# Patient Record
Sex: FEMALE | Race: WHITE | HISPANIC OR LATINO | Employment: UNEMPLOYED | URBAN - METROPOLITAN AREA
[De-identification: names, ages, dates, MRNs, and addresses within clinical notes are randomized per-mention and may not be internally consistent; named-entity substitution may affect disease eponyms.]

---
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
## 2024-03-20 ENCOUNTER — OFFICE VISIT (OUTPATIENT)
Dept: MEDICAL GROUP | Facility: MEDICAL CENTER | Age: 19
End: 2024-03-20
Attending: FAMILY MEDICINE
Payer: COMMERCIAL

## 2024-03-20 VITALS
TEMPERATURE: 97.5 F | RESPIRATION RATE: 16 BRPM | BODY MASS INDEX: 26.1 KG/M2 | SYSTOLIC BLOOD PRESSURE: 90 MMHG | DIASTOLIC BLOOD PRESSURE: 60 MMHG | HEART RATE: 108 BPM | HEIGHT: 66 IN | WEIGHT: 162.4 LBS | OXYGEN SATURATION: 98 %

## 2024-03-20 DIAGNOSIS — R03.1 INCIDENTAL FINDING OF LOW BLOOD PRESSURE: ICD-10-CM

## 2024-03-20 DIAGNOSIS — Z3A.16 16 WEEKS GESTATION OF PREGNANCY: ICD-10-CM

## 2024-03-20 PROCEDURE — 3074F SYST BP LT 130 MM HG: CPT | Performed by: FAMILY MEDICINE

## 2024-03-20 PROCEDURE — 3078F DIAST BP <80 MM HG: CPT | Performed by: FAMILY MEDICINE

## 2024-03-20 PROCEDURE — 99204 OFFICE O/P NEW MOD 45 MIN: CPT | Performed by: FAMILY MEDICINE

## 2024-03-20 ASSESSMENT — PATIENT HEALTH QUESTIONNAIRE - PHQ9: CLINICAL INTERPRETATION OF PHQ2 SCORE: 0

## 2024-03-20 NOTE — LETTER
[facility]  Elana Luo M.D.  [address]  [place] [address]  Fax: [phone]   Authorization for Release/Disclosure of   Protected Health Information   Name: ROSARIO MERCER : 2005 SSN: [id number]   Address: [address] [place] [id number] Phone:    [phone] (home)    I authorize the entity listed below to release/disclose the PHI below to:   [facility]/Elana Luo M.D. and Ana Cristina Garvin M.D.   Provider or Entity Name:  [facility]  [address] [place] [address]  [phone]  Fax: [phone]   Address   City, State, Zip   Phone:      Fax:     Reason for request: continuity of care   Information to be released:    [  ] LAST COLONOSCOPY,  including any PATH REPORT and follow-up  [  ] LAST FIT/COLOGUARD RESULT [  ] LAST DEXA  [  ] LAST MAMMOGRAM  [  ] LAST PAP  [  ] LAST LABS [  ] RETINA EXAM REPORT  [  ] IMMUNIZATION RECORDS  [  ] Release all info      [  ] Check here and initial the line next to each item to release ALL health information INCLUDING  _____ Care and treatment for drug and / or alcohol abuse  _____ HIV testing, infection status, or AIDS  _____ Genetic Testing    DATES OF SERVICE OR TIME PERIOD TO BE DISCLOSED: _____________  I understand and acknowledge that:  * This Authorization may be revoked at any time by you in writing, except if your health information has already been used or disclosed.  * Your health information that will be used or disclosed as a result of you signing this authorization could be re-disclosed by the recipient. If this occurs, your re-disclosed health information may no longer be protected by State or Federal laws.  * You may refuse to sign this Authorization. Your refusal will not affect your ability to obtain treatment.  * This Authorization becomes effective upon signing and will  on (date) __________.      If no date is  indicated, this Authorization will  one (1) year from the signature date.    Name: Mee Ko  Signature: Date:   3/20/2024     PLEASE FAX REQUESTED RECORDS BACK TO: [phone]

## 2024-03-20 NOTE — PROGRESS NOTES
Verbal consent was acquired by the patient to use ByteLight ambient listening note generation during this visit     Subjective:     CC:    Chief Complaint   Patient presents with    Establish Care       HISTORY OF THE PRESENT ILLNESS: Mee Ko is a 19 y.o. female. This pleasant patient is here today to establish primary care with me and discuss the following issues.   Her prior PCP was through [facility]; last seen ~4-5 years ago    Specialists   Ob/gyn    History of Present Illness  The patient presents for establishment of care.     She has not been to a doctor since she was 15 or 16 years old. She has been seeing a doctor in [place] since . She is currently 16 weeks pregnant. She has an OB/GYN, Dr. George Luo, but she quit her job and lost insurance so has been unable to follow up. She last saw Dr. Luo around 01/15/2025. Her estimated due date is 2024. Her pregnancy is going well so far. This was unplanned, but welcomed pregnancy. She had an  when she was 17 years old. Her current partner is the father of the baby. She also had 1 miscarriage. She had Nexplanon implant when she was 13, 14, and 15 years old, which was removed in 2019. She is in a monogamous relationship. She feels safe in her relationship. She is taking prenatal vitamins. She does not take any other medications on a regular basis. She did not have her prenatal labs through Dr. Luo's office. She was told not to lift more than 30 pounds, but she is inquiring if there is a higher weight limit she can go to.  She has been working to become a  before becoming pregnant so would like to be more actie. She has not detected fetal movement yet. She denies any abnormal vaginal discharge or bleeding.  Mood is fine.     She denies any dizziness, lightheadedness, or shortness of breath. She has been a little dehydrated. She has been drinking Gatorade and trying to  increase her intake, but has noted a decrease in her water intake since becoming pregnant.    Supplemental Information  She has 200 out of 100 vision in her right eye. She has eczema, which flares up occasionally. She wears prescription glasses. She sees an optometrist.   She denies any vaping, electronic devices, traditional smoking, or alcohol use. She smoked marijuana regularly when she was 11 or 12 years old. She last used marijuana in 10/2024. She is currently unemployed. 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 She completed high school in 2020. She is in school for personal training, but she is not certified yet. She has 2 dogs.   She denies any medical problems in her mother. Her father has autoimmune disease, arthritis, and ankylosing spondylitis. Her brother was diagnosed with schizophrenia when he was 16 or 17 years old and passed away in . He committed suicide. Her maternal grandmother is alive, but her father passed away 20 years ago. Her paternal grandfather is alive. She denies any family history of diabetes, heart disease, or cancer.    Allergies: Patient has no known allergies.      [id number] - [place], [place] - [address]  [address] [facility]  [place] [address]  Phone: [phone] Fax: [phone]      Current Outpatient Medications   Medication Sig Dispense Refill    Prenatal Vit-Fe Fumarate-FA (PRENATAL VITAMINS PO) Take  by mouth.       No current facility-administered medications for this visit.       No past medical history on file.    No past surgical history on file.    Family History   Problem Relation Age of Onset    Autoimmune Disease Father         ankylosing spondylitis    Eczema Father     Psychiatric Illness Brother          by suicide; h/o schizophrenia depression    Diabetes Neg Hx     Heart Disease Neg Hx     Cancer Neg Hx        Social History     Tobacco Use    Smoking status: Never    Smokeless tobacco: Never   Vaping Use    Vaping Use: Never used  "  Substance Use Topics    Alcohol use: Never    Drug use: Not Currently     Types: Marijuana     Comment: smoked consistently in [age]; decreased since then. Last use 10/2023         Objective:     BP 90/60   Pulse (!) 108   Temp 36.4 °C (97.5 °F)   Resp 16   Ht 1.676 m (5' 6\")   Wt 73.7 kg (162 lb 6.4 oz)   SpO2 98%   BMI 26.21 kg/m²   Physical Exam  Constitutional: Alert, no distress  Skin: No rashes in visible areas  Eye: Conjunctiva clear, lids normal  Respiratory: Unlabored respiratory effort on room air, no cough, lungs clear to auscultation bilaterally  CV: RRR  MSK: Normal gait, moves all extremities  Psych: Alert and oriented x3, normal affect and mood      Assessment & Plan:   19 y.o. female with the following -    1. 16 weeks gestation of pregnancy  -Patient previously established with Dr. George Luo as new OB visit however has been lost to follow-up due to lapse in insurance coverage.  Patient clinically stable.  She is due for prenatal labs and imaging as ordered.  Will start workup and have patient reestablish with new OB for further evaluation and management.  - Encouraged continue healthy lifestyle habits including regular moderate intensity aerobic activity  - Referral to OB/Gyn  - PRENATAL PANEL 3+HIV+HCV; Future  - US-OB 2ND 3RD TRI COMPLETE    2. Incidental finding of low blood pressure  -Incidental finding on inpatient visit to establish.  Discussed recommendation to come in for nurse visit to recheck BP as I am unable to determine patient's baseline.  Discussed may be on low normal side chronically due to her athletic lifestyle.  However given report of decreased water intake expressed concern that blood pressures may be related to dehydration.  Stressed importance of proper water intake to help promote healthy pregnancy.  She is clinically asymptomatic otherwise and is feeling well.    Strict ER/Call/Return precautions discussed. She verbalized understanding and agreed with " plan.    Other orders  - Prenatal Vit-Fe Fumarate-FA (PRENATAL VITAMINS PO); Take  by mouth.       Return if symptoms worsen or fail to improve.    Please note that this dictation was created using voice recognition software. I have made every reasonable attempt to correct obvious errors, but I expect that there are errors of grammar and possibly content that I did not discover before finalizing the note.

## 2024-03-22 ENCOUNTER — HOSPITAL ENCOUNTER (OUTPATIENT)
Dept: LAB | Facility: MEDICAL CENTER | Age: 19
End: 2024-03-22
Attending: FAMILY MEDICINE
Payer: COMMERCIAL

## 2024-03-22 DIAGNOSIS — Z3A.16 16 WEEKS GESTATION OF PREGNANCY: ICD-10-CM

## 2024-03-22 LAB
ABO GROUP BLD: NORMAL
BASOPHILS # BLD AUTO: 0.6 % (ref 0–1.8)
BASOPHILS # BLD: 0.08 K/UL (ref 0–0.12)
BLD GP AB SCN SERPL QL: NORMAL
EOSINOPHIL # BLD AUTO: 0.33 K/UL (ref 0–0.51)
EOSINOPHIL NFR BLD: 2.4 % (ref 0–6.9)
ERYTHROCYTE [DISTWIDTH] IN BLOOD BY AUTOMATED COUNT: 44.9 FL (ref 35.9–50)
HBV SURFACE AG SER QL: ABNORMAL
HCT VFR BLD AUTO: 39.2 % (ref 37–47)
HCV AB SER QL: ABNORMAL
HGB BLD-MCNC: 13.6 G/DL (ref 12–16)
HIV 1+2 AB+HIV1 P24 AG SERPL QL IA: NORMAL
IMM GRANULOCYTES # BLD AUTO: 0.24 K/UL (ref 0–0.11)
IMM GRANULOCYTES NFR BLD AUTO: 1.7 % (ref 0–0.9)
LYMPHOCYTES # BLD AUTO: 2.15 K/UL (ref 1–4.8)
LYMPHOCYTES NFR BLD: 15.6 % (ref 22–41)
MCH RBC QN AUTO: 30.4 PG (ref 27–33)
MCHC RBC AUTO-ENTMCNC: 34.7 G/DL (ref 32.2–35.5)
MCV RBC AUTO: 87.7 FL (ref 81.4–97.8)
MONOCYTES # BLD AUTO: 0.84 K/UL (ref 0–0.85)
MONOCYTES NFR BLD AUTO: 6.1 % (ref 0–13.4)
NEUTROPHILS # BLD AUTO: 10.13 K/UL (ref 1.82–7.42)
NEUTROPHILS NFR BLD: 73.6 % (ref 44–72)
NRBC # BLD AUTO: 0 K/UL
NRBC BLD-RTO: 0 /100 WBC (ref 0–0.2)
PLATELET # BLD AUTO: 216 K/UL (ref 164–446)
PMV BLD AUTO: 11 FL (ref 9–12.9)
RBC # BLD AUTO: 4.47 M/UL (ref 4.2–5.4)
RH BLD: NORMAL
RUBV AB SER QL: 63.9 IU/ML
T PALLIDUM AB SER QL IA: ABNORMAL
WBC # BLD AUTO: 13.8 K/UL (ref 4.8–10.8)

## 2024-03-22 PROCEDURE — 87389 HIV-1 AG W/HIV-1&-2 AB AG IA: CPT

## 2024-03-22 PROCEDURE — 86592 SYPHILIS TEST NON-TREP QUAL: CPT

## 2024-03-22 PROCEDURE — 85025 COMPLETE CBC W/AUTO DIFF WBC: CPT

## 2024-03-22 PROCEDURE — 36415 COLL VENOUS BLD VENIPUNCTURE: CPT

## 2024-03-22 PROCEDURE — 86780 TREPONEMA PALLIDUM: CPT

## 2024-03-22 PROCEDURE — 86850 RBC ANTIBODY SCREEN: CPT

## 2024-03-22 PROCEDURE — 86901 BLOOD TYPING SEROLOGIC RH(D): CPT

## 2024-03-22 PROCEDURE — 87340 HEPATITIS B SURFACE AG IA: CPT

## 2024-03-22 PROCEDURE — 86803 HEPATITIS C AB TEST: CPT

## 2024-03-22 PROCEDURE — 86900 BLOOD TYPING SEROLOGIC ABO: CPT

## 2024-03-22 PROCEDURE — 86762 RUBELLA ANTIBODY: CPT

## 2024-03-26 ENCOUNTER — INITIAL PRENATAL (OUTPATIENT)
Dept: OBGYN | Facility: CLINIC | Age: 19
End: 2024-03-26
Payer: COMMERCIAL

## 2024-03-26 ENCOUNTER — APPOINTMENT (OUTPATIENT)
Dept: OBGYN | Facility: CLINIC | Age: 19
End: 2024-03-26
Payer: COMMERCIAL

## 2024-03-26 VITALS — DIASTOLIC BLOOD PRESSURE: 54 MMHG | BODY MASS INDEX: 26.76 KG/M2 | WEIGHT: 165.8 LBS | SYSTOLIC BLOOD PRESSURE: 106 MMHG

## 2024-03-26 DIAGNOSIS — Z34.80 SUPERVISION OF OTHER NORMAL PREGNANCY, ANTEPARTUM: ICD-10-CM

## 2024-03-26 PROCEDURE — 3074F SYST BP LT 130 MM HG: CPT | Performed by: NURSE PRACTITIONER

## 2024-03-26 PROCEDURE — 0500F INITIAL PRENATAL CARE VISIT: CPT | Performed by: NURSE PRACTITIONER

## 2024-03-26 PROCEDURE — 3078F DIAST BP <80 MM HG: CPT | Performed by: NURSE PRACTITIONER

## 2024-03-26 ASSESSMENT — EDINBURGH POSTNATAL DEPRESSION SCALE (EPDS)
I HAVE BEEN ABLE TO LAUGH AND SEE THE FUNNY SIDE OF THINGS: AS MUCH AS I ALWAYS COULD
I HAVE BLAMED MYSELF UNNECESSARILY WHEN THINGS WENT WRONG: NO, NEVER
I HAVE BEEN ANXIOUS OR WORRIED FOR NO GOOD REASON: YES, SOMETIMES
I HAVE FELT SAD OR MISERABLE: NOT VERY OFTEN
I HAVE BEEN SO UNHAPPY THAT I HAVE HAD DIFFICULTY SLEEPING: YES, SOMETIMES
I HAVE BEEN SO UNHAPPY THAT I HAVE BEEN CRYING: ONLY OCCASIONALLY
TOTAL SCORE: 10
THE THOUGHT OF HARMING MYSELF HAS OCCURRED TO ME: NEVER
I HAVE LOOKED FORWARD WITH ENJOYMENT TO THINGS: AS MUCH AS I EVER DID
I HAVE FELT SCARED OR PANICKY FOR NO GOOD REASON: YES, SOMETIMES
THINGS HAVE BEEN GETTING ON TOP OF ME: YES, SOMETIMES I HAVEN'T BEEN COPING AS WELL AS USUAL

## 2024-03-26 NOTE — PROGRESS NOTES
Subjective:   Mee Ko is a 19 y.o.  who presents for her new OB exam.  She is 17w3d with an WILMER of Estimated Date of Delivery: 24 by LMP she was tracking. She is feeling well. Denies VB, LOF, contractions or pain. No ER visits or previous care in this pregnancy. Denies dysuria, vaginal DC, fever. Reports fetal movement. Desires AFP.  Declines CF.  Desires NIPT testing.     History reviewed. No pertinent past medical history.    Psych Hx: Patient denies any history of depression, anxiety, PTSD, bipolar or any other psychological issues.     EPDS completed: 10    History reviewed. No pertinent surgical history.     OB History    Para Term  AB Living   3 0     2     SAB IAB Ectopic Molar Multiple Live Births   1 1              # Outcome Date GA Lbr Alverto/2nd Weight Sex Delivery Anes PTL Lv   3 Current            2 SAB 2022 4w0d             Birth Comments: Passed on its own   1 IAB 2022     TAB         Obstetric Comments   Surgical  at age 17 at 8 weeks        Gynecological Hx: Denies any hx of STIs, including HSV. Denies any vulvovaginal disorders and no hx of abnormal cervical cytology. Last pap done with Dr. Luo.    Sexual Hx: One current male partner, who is FOB.    Contraceptive Hx: Has used nexplanon in the past and has since discontinued use.     Family History   Problem Relation Age of Onset    No Known Problems Mother     Autoimmune Disease Father         ankylosing spondylitis    Eczema Father     Psychiatric Illness Brother          by suicide; h/o schizophrenia depression    Diabetes Neg Hx     Heart Disease Neg Hx     Cancer Neg Hx      Denies any genetic disorders in family history.     Social History     Socioeconomic History    Marital status: Single     Spouse name: Not on file    Number of children: Not on file    Years of education: Not on file    Highest education level: High school graduate   Occupational History     Comment: Currently unemployed,  previously in    Tobacco Use    Smoking status: Never    Smokeless tobacco: Never   Vaping Use    Vaping Use: Never used   Substance and Sexual Activity    Alcohol use: Never    Drug use: Not Currently     Types: Marijuana     Comment: smoked consistently in [age]; decreased since then. Last use 10/2023    Sexual activity: Yes     Partners: Male     Comment: Monogamous relationship   Other Topics Concern    Not on file   Social History Narrative    Lives with partner/FOB; 2 dogs dhara     Social Determinants of Health     Financial Resource Strain: Not on file   Food Insecurity: Not on file   Transportation Needs: Not on file   Physical Activity: Not on file   Stress: Not on file   Social Connections: Not on file   Intimate Partner Violence: Not on file   Housing Stability: Not on file       FOB is involved and lives with Mee Ko.  Pregnancy is unplanned but desired.    She is currently not working, denies any heavy lifting or exposure to potential teratogens like environmental or occupational toxins.   Denies alcohol use, drug use, or tobacco use in pregnancy.   Denies any current or hx of sexual, emotional or physical abuse or trauma.     Current Medications: PNV   Allergies: Denies allergies to medications, food, or environmental allergies    Objective:      Vitals:    03/26/24 1258   BP: 106/54   Weight: 165 lb 12.8 oz        See Prenatal Physical and Prenatal Vitals      Assessment:      1.  IUP @ 17w3d per US      2.  S=D      3.  See problem list as follows      Plan:   - GC/CT/vaginal pathogens & pap records requested from [facility]  - Encouraged to get flu vacc in local pharmacy as none in clinic now  - NIPT and AFP will be ordered after US records received from [facility] office  - Prenatal labs WNL  - Discussed PNV, nutrition, adequate water intake, and exercise/weight gain in pregnancy  - NOB informational packet with anticipatory guidance given  - Information on Centering Pregnancy  given, n/a  - S/sx of pregnancy warning signs and PTL precautions given  - Complete OB US scheduled   - Return to [facility] in 4 wks

## 2024-03-26 NOTE — PROGRESS NOTES
NOB visit  LMP: 11/25/2023  Good # [phone]  Pt states no complaints or concerns today  Last pap: 01/2024 Negative per pt. Done at Dr Luo office.     Genetic testing: Pt desires   EPDS Score: 10

## 2024-04-11 ENCOUNTER — HOSPITAL ENCOUNTER (EMERGENCY)
Facility: MEDICAL CENTER | Age: 19
End: 2024-04-12
Attending: STUDENT IN AN ORGANIZED HEALTH CARE EDUCATION/TRAINING PROGRAM | Admitting: STUDENT IN AN ORGANIZED HEALTH CARE EDUCATION/TRAINING PROGRAM
Payer: COMMERCIAL

## 2024-04-11 DIAGNOSIS — Z34.80 SUPERVISION OF OTHER NORMAL PREGNANCY, ANTEPARTUM: ICD-10-CM

## 2024-04-11 LAB
APPEARANCE UR: CLEAR
COLOR UR AUTO: YELLOW
GLUCOSE UR QL STRIP.AUTO: NEGATIVE MG/DL
KETONES UR QL STRIP.AUTO: 80 MG/DL
LEUKOCYTE ESTERASE UR QL STRIP.AUTO: NEGATIVE
NITRITE UR QL STRIP.AUTO: NEGATIVE
PH UR STRIP.AUTO: 6 [PH] (ref 5–8)
PROT UR QL STRIP: 30 MG/DL
RBC UR QL AUTO: NEGATIVE
SP GR UR STRIP.AUTO: >=1.03 (ref 1–1.03)

## 2024-04-11 PROCEDURE — 700111 HCHG RX REV CODE 636 W/ 250 OVERRIDE (IP): Mod: JZ,UD | Performed by: MIDWIFE

## 2024-04-11 PROCEDURE — 99282 EMERGENCY DEPT VISIT SF MDM: CPT | Mod: 25

## 2024-04-11 PROCEDURE — 96374 THER/PROPH/DIAG INJ IV PUSH: CPT

## 2024-04-11 PROCEDURE — 700105 HCHG RX REV CODE 258: Mod: UD | Performed by: MIDWIFE

## 2024-04-11 PROCEDURE — 81002 URINALYSIS NONAUTO W/O SCOPE: CPT

## 2024-04-11 RX ORDER — ONDANSETRON 2 MG/ML
4 INJECTION INTRAMUSCULAR; INTRAVENOUS EVERY 4 HOURS PRN
Status: DISCONTINUED | OUTPATIENT
Start: 2024-04-11 | End: 2024-04-12 | Stop reason: HOSPADM

## 2024-04-11 RX ORDER — SODIUM CHLORIDE, SODIUM LACTATE, POTASSIUM CHLORIDE, AND CALCIUM CHLORIDE .6; .31; .03; .02 G/100ML; G/100ML; G/100ML; G/100ML
1000 INJECTION, SOLUTION INTRAVENOUS ONCE
Status: COMPLETED | OUTPATIENT
Start: 2024-04-11 | End: 2024-04-11

## 2024-04-11 RX ADMIN — ONDANSETRON 4 MG: 2 INJECTION INTRAMUSCULAR; INTRAVENOUS at 23:34

## 2024-04-11 RX ADMIN — SODIUM CHLORIDE, POTASSIUM CHLORIDE, SODIUM LACTATE AND CALCIUM CHLORIDE 1000 ML: 600; 310; 30; 20 INJECTION, SOLUTION INTRAVENOUS at 23:34

## 2024-04-12 VITALS
DIASTOLIC BLOOD PRESSURE: 52 MMHG | TEMPERATURE: 97.8 F | RESPIRATION RATE: 18 BRPM | BODY MASS INDEX: 28.17 KG/M2 | OXYGEN SATURATION: 95 % | HEART RATE: 92 BPM | WEIGHT: 165 LBS | SYSTOLIC BLOOD PRESSURE: 96 MMHG | HEIGHT: 64 IN

## 2024-04-12 PROCEDURE — 36415 COLL VENOUS BLD VENIPUNCTURE: CPT

## 2024-04-12 RX ORDER — ONDANSETRON 4 MG/1
4 TABLET, FILM COATED ORAL EVERY 4 HOURS PRN
Qty: 20 TABLET | Refills: 0 | Status: SHIPPED | OUTPATIENT
Start: 2024-04-12 | End: 2024-05-12

## 2024-04-12 ASSESSMENT — ENCOUNTER SYMPTOMS
CONSTITUTIONAL NEGATIVE: 1
VOMITING: 1
NAUSEA: 1

## 2024-04-12 NOTE — ED PROVIDER NOTES
Emergency Obstetric Consultation     Date of Service  2024    Reason for Consultation  Nausea and vomiting since 24 at 1900.    History of Presenting Illness  19 y.o. female  at 19.6 wks who presented 2024 with nausea and vomiting since eating her dinner. Denies abdominal pain, fever, malaise. No significant PMH. Uncomplicated pregnancy.     Reports feeling better with IVF hydration and Zofran.    Review of Systems  Review of Systems   Constitutional: Negative.    Gastrointestinal:  Positive for nausea and vomiting.       Obstetric History    OB History    Para Term  AB Living   3 0     2     SAB IAB Ectopic Molar Multiple Live Births   1 1              # Outcome Date GA Lbr Alverto/2nd Weight Sex Delivery Anes PTL Lv   3 Current            2 SAB 2022 4w0d             Birth Comments: Passed on its own   1 IAB 2022     TAB         Obstetric Comments   Surgical  at age 17 at 8 weeks       Gynecologic History  Patient's last menstrual period was 2023 (approximate).    Medical History  No past medical history on file.    Surgical History   has no past surgical history on file.    Family History  family history includes Autoimmune Disease in her father; Eczema in her father; No Known Problems in her mother; Psychiatric Illness in her brother.    Social History   reports that she has never smoked. She has never used smokeless tobacco. She reports that she does not currently use drugs after having used the following drugs: Marijuana. She reports that she does not drink alcohol.    Medications  Medications Prior to Admission   Medication Sig Dispense Refill Last Dose    Prenatal Vit-Fe Fumarate-FA (PRENATAL VITAMINS PO) Take  by mouth.          Allergies  No Known Allergies    Physical Exam  Maternal Exam:  Uterine Assessment: none  Abdomen: Patient reports no abdominal tenderness. Cervix: not evaluated.  Mode: hand-held doppler probe.    Baseline rate: 150.          Laboratory               None obtained         Imaging  None    Assessment & Plan  Assessment:  Not in labor.   Membrane status: intact.   Fetal well-being: normal.   Nausea and vomiting, resolved  No abdominal pain  Not in labor    Plan:  1. Pt to be discharged home after proves to tolerate PO intake.  2. Zofran Rx for home.  3. Follow up in office at next regularly scheduled visit or sooner prn.   4. 2nd trimester and GI disease warning signs discussed, when to return discussed.          AIDA BellNOBDULIO.

## 2024-04-22 ENCOUNTER — HOSPITAL ENCOUNTER (OUTPATIENT)
Dept: RADIOLOGY | Facility: MEDICAL CENTER | Age: 19
End: 2024-04-22
Attending: FAMILY MEDICINE
Payer: COMMERCIAL

## 2024-04-22 PROCEDURE — 76805 OB US >/= 14 WKS SNGL FETUS: CPT

## 2024-05-01 ENCOUNTER — HOSPITAL ENCOUNTER (OUTPATIENT)
Dept: LAB | Facility: MEDICAL CENTER | Age: 19
End: 2024-05-01
Attending: ADVANCED PRACTICE MIDWIFE
Payer: COMMERCIAL

## 2024-05-01 ENCOUNTER — APPOINTMENT (OUTPATIENT)
Dept: OBGYN | Facility: CLINIC | Age: 19
End: 2024-05-01
Payer: COMMERCIAL

## 2024-05-01 VITALS — BODY MASS INDEX: 29.87 KG/M2 | DIASTOLIC BLOOD PRESSURE: 50 MMHG | WEIGHT: 174 LBS | SYSTOLIC BLOOD PRESSURE: 102 MMHG

## 2024-05-01 DIAGNOSIS — Z34.82 ENCOUNTER FOR SUPERVISION OF OTHER NORMAL PREGNANCY IN SECOND TRIMESTER: ICD-10-CM

## 2024-05-01 PROCEDURE — 3074F SYST BP LT 130 MM HG: CPT | Performed by: ADVANCED PRACTICE MIDWIFE

## 2024-05-01 PROCEDURE — 3078F DIAST BP <80 MM HG: CPT | Performed by: ADVANCED PRACTICE MIDWIFE

## 2024-05-01 PROCEDURE — 0502F SUBSEQUENT PRENATAL CARE: CPT | Performed by: ADVANCED PRACTICE MIDWIFE

## 2024-05-03 LAB
# FETUSES US: NORMAL
AFP MOM SERPL: 0.9
AFP SERPL-MCNC: 76 NG/ML
AGE - REPORTED: 21.5 YR
CURRENT SMOKER: NO
FAMILY MEMBER DISEASES HX: NO
GA METHOD: NORMAL
GA: NORMAL WK
HCG MOM SERPL: 1.56
HCG SERPL-ACNC: NORMAL IU/L
HX OF HEREDITARY DISORDERS: NO
IDDM PATIENT QL: NO
INHIBIN A MOM SERPL: 0.76
INHIBIN A SERPL-MCNC: 155 PG/ML
INTEGRATED SCN PATIENT-IMP: NORMAL
PATHOLOGY STUDY: NORMAL
SPECIMEN DRAWN SERPL: NORMAL
U ESTRIOL MOM SERPL: 0.7
U ESTRIOL SERPL-MCNC: 3.05 NG/ML

## 2024-06-03 ENCOUNTER — APPOINTMENT (OUTPATIENT)
Dept: OBGYN | Facility: CLINIC | Age: 19
End: 2024-06-03
Payer: COMMERCIAL

## 2024-06-03 VITALS — DIASTOLIC BLOOD PRESSURE: 58 MMHG | SYSTOLIC BLOOD PRESSURE: 100 MMHG | BODY MASS INDEX: 31.76 KG/M2 | WEIGHT: 185 LBS

## 2024-06-03 DIAGNOSIS — Z34.80 SUPERVISION OF OTHER NORMAL PREGNANCY, ANTEPARTUM: Primary | ICD-10-CM

## 2024-06-03 PROCEDURE — 0502F SUBSEQUENT PRENATAL CARE: CPT | Performed by: NURSE PRACTITIONER

## 2024-06-03 PROCEDURE — 90715 TDAP VACCINE 7 YRS/> IM: CPT | Performed by: NURSE PRACTITIONER

## 2024-06-03 PROCEDURE — 90471 IMMUNIZATION ADMIN: CPT | Performed by: NURSE PRACTITIONER

## 2024-06-03 NOTE — PROGRESS NOTES
OB follow up   + fetal movement.  No VB, LOF or UC's.  Phone # [phone]  Preferred pharmacy confirmed.  3rd trimester lab orders pended and instructions given to patient.  Kick count sheet given today.  TDap today

## 2024-06-03 NOTE — LETTER
"Count Your Baby's Movements  Another step to a healthy delivery    Meetonya Ko  [facility]  Dept: [phone]    How Many Weeks Pregnant? 27w2d    Date to Begin Countin24              How to use this chart    One way for your physician to keep track of your baby's health is by knowing how often the baby moves (or \"kicks\") in your womb.  You can help your physician to do this by using this chart every day.    Every day, you should see how many hours it takes for your baby to move 10 times.  Start in the morning, as soon as you get up.    First, write down the time your baby moves until you get to 10.  Check off one box every time your baby moves until you get to 10.  Write down the time you finished counting in the last column.  Total how long it took to count up all 10 movements.  Finally, fill in the box that shows how long this took.  After counting 10 movements, you no longer have to count any more that day.  The next morning, just start counting again as soon as you get up.    What should you call a \"movement\"?  It is hard to say, because it will feel different from one mother to another and from one pregnancy to the next.  The important thing is that you count the movements the same way throughout your pregnancy.  If you have more questions, you should ask your physician.    Count carefully every day!  SAMPLE:  Week 28    How many hours did it take to feel 10 movements?       Start  Time     1     2     3     4     5     6     7     8     9     10   Finish Time   Mon 8:20           11:40                                 Fri               Sat               Sun                 IMPORTANT: You should contact your physician if it takes more than two hours for you to feel 10 movements.  Each morning, write down the time and start to count the movements of your baby.  Keep track by checking off one box every time you feel one movement.  When you " Patient verbalized understanding of discharge instructions, need for followup with PMD and/or specialist without fail.  Patient also provided with signs and symptoms to return to the emergency department immediately.  Patient A+Ox3, resps even and nonlabored, patient in no apparent distress. "have felt 10 \"kicks\", write down the time you finished counting in the last column.  Then fill in the   box (over the check antoine) for the number of hours it took.  Be sure to read the complete instructions on the previous page.            "

## 2024-06-07 ENCOUNTER — TELEPHONE (OUTPATIENT)
Dept: OBGYN | Facility: CLINIC | Age: 19
End: 2024-06-07
Payer: COMMERCIAL

## 2024-06-07 NOTE — TELEPHONE ENCOUNTER
Caller Name: Mee Ko    Call Back Number: [phone]     How would the patient prefer to be contacted with a response: Phone call do NOT leave a detailed message    Pt called asking if Whey Protein Powder is safe during pregnancy, she is currently 27w 6d today.    After consulting with another MA (Doreen) we advised the patient to bring the container in for her upcoming OBFU on 06/20/2024 so her provider can look into it for her.    Pt had no further questions or concerns.

## 2024-06-20 ENCOUNTER — ROUTINE PRENATAL (OUTPATIENT)
Dept: OBGYN | Facility: CLINIC | Age: 19
End: 2024-06-20
Payer: COMMERCIAL

## 2024-06-20 VITALS — WEIGHT: 191 LBS | SYSTOLIC BLOOD PRESSURE: 122 MMHG | BODY MASS INDEX: 32.79 KG/M2 | DIASTOLIC BLOOD PRESSURE: 66 MMHG

## 2024-06-20 DIAGNOSIS — Z34.82 ENCOUNTER FOR SUPERVISION OF OTHER NORMAL PREGNANCY IN SECOND TRIMESTER: ICD-10-CM

## 2024-06-20 PROCEDURE — 0502F SUBSEQUENT PRENATAL CARE: CPT | Performed by: OBSTETRICS & GYNECOLOGY

## 2024-06-20 NOTE — PROGRESS NOTES
Pt here today for OB follow up  Pt states no complaints.  Reports +FM  Good # [phone]  Pharmacy Confirmed.  Chaperone offered and not indicated.   Pt declines BTL  Pt states she will try and complete labs on 6/21/2024.

## 2024-06-21 ENCOUNTER — HOSPITAL ENCOUNTER (OUTPATIENT)
Dept: LAB | Facility: MEDICAL CENTER | Age: 19
End: 2024-06-21
Attending: NURSE PRACTITIONER
Payer: COMMERCIAL

## 2024-06-21 DIAGNOSIS — Z34.80 SUPERVISION OF OTHER NORMAL PREGNANCY, ANTEPARTUM: ICD-10-CM

## 2024-06-21 LAB
ERYTHROCYTE [DISTWIDTH] IN BLOOD BY AUTOMATED COUNT: 42.6 FL (ref 35.9–50)
GLUCOSE 1H P 50 G GLC PO SERPL-MCNC: 124 MG/DL (ref 70–139)
HCT VFR BLD AUTO: 37.5 % (ref 37–47)
HGB BLD-MCNC: 12.4 G/DL (ref 12–16)
MCH RBC QN AUTO: 29.7 PG (ref 27–33)
MCHC RBC AUTO-ENTMCNC: 33.1 G/DL (ref 32.2–35.5)
MCV RBC AUTO: 89.7 FL (ref 81.4–97.8)
PLATELET # BLD AUTO: 224 K/UL (ref 164–446)
PMV BLD AUTO: 10.7 FL (ref 9–12.9)
RBC # BLD AUTO: 4.18 M/UL (ref 4.2–5.4)
T PALLIDUM AB SER QL IA: NORMAL
WBC # BLD AUTO: 13.1 K/UL (ref 4.8–10.8)

## 2024-06-21 PROCEDURE — 85027 COMPLETE CBC AUTOMATED: CPT

## 2024-06-21 PROCEDURE — 86780 TREPONEMA PALLIDUM: CPT

## 2024-06-21 PROCEDURE — 36415 COLL VENOUS BLD VENIPUNCTURE: CPT

## 2024-06-21 PROCEDURE — 82950 GLUCOSE TEST: CPT

## 2024-07-02 ENCOUNTER — ROUTINE PRENATAL (OUTPATIENT)
Dept: OBGYN | Facility: CLINIC | Age: 19
End: 2024-07-02
Payer: COMMERCIAL

## 2024-07-02 VITALS — DIASTOLIC BLOOD PRESSURE: 62 MMHG | SYSTOLIC BLOOD PRESSURE: 96 MMHG | BODY MASS INDEX: 32.27 KG/M2 | WEIGHT: 188 LBS

## 2024-07-02 DIAGNOSIS — Z34.80 SUPERVISION OF OTHER NORMAL PREGNANCY, ANTEPARTUM: ICD-10-CM

## 2024-07-02 PROCEDURE — 0502F SUBSEQUENT PRENATAL CARE: CPT | Performed by: NURSE PRACTITIONER

## 2024-07-02 PROCEDURE — 3078F DIAST BP <80 MM HG: CPT | Performed by: NURSE PRACTITIONER

## 2024-07-02 PROCEDURE — 3074F SYST BP LT 130 MM HG: CPT | Performed by: NURSE PRACTITIONER

## 2024-07-24 ENCOUNTER — APPOINTMENT (OUTPATIENT)
Dept: OBGYN | Facility: CLINIC | Age: 19
End: 2024-07-24
Payer: COMMERCIAL

## 2024-07-25 ENCOUNTER — ROUTINE PRENATAL (OUTPATIENT)
Dept: OBGYN | Facility: CLINIC | Age: 19
End: 2024-07-25
Payer: COMMERCIAL

## 2024-07-25 ENCOUNTER — HOSPITAL ENCOUNTER (OUTPATIENT)
Dept: LAB | Facility: MEDICAL CENTER | Age: 19
End: 2024-07-25
Attending: OBSTETRICS & GYNECOLOGY
Payer: COMMERCIAL

## 2024-07-25 VITALS — WEIGHT: 198.4 LBS | SYSTOLIC BLOOD PRESSURE: 120 MMHG | DIASTOLIC BLOOD PRESSURE: 80 MMHG | BODY MASS INDEX: 34.06 KG/M2

## 2024-07-25 DIAGNOSIS — Z34.83 PRENATAL CARE, SUBSEQUENT PREGNANCY IN THIRD TRIMESTER: ICD-10-CM

## 2024-07-25 PROCEDURE — 87491 CHLMYD TRACH DNA AMP PROBE: CPT

## 2024-07-25 PROCEDURE — 3074F SYST BP LT 130 MM HG: CPT | Performed by: OBSTETRICS & GYNECOLOGY

## 2024-07-25 PROCEDURE — 3079F DIAST BP 80-89 MM HG: CPT | Performed by: OBSTETRICS & GYNECOLOGY

## 2024-07-25 PROCEDURE — 0502F SUBSEQUENT PRENATAL CARE: CPT | Performed by: OBSTETRICS & GYNECOLOGY

## 2024-07-25 PROCEDURE — 87591 N.GONORRHOEAE DNA AMP PROB: CPT

## 2024-07-26 LAB
C TRACH DNA SPEC QL NAA+PROBE: NEGATIVE
N GONORRHOEA DNA SPEC QL NAA+PROBE: NEGATIVE
SPECIMEN SOURCE: NORMAL

## 2024-07-27 ENCOUNTER — HOSPITAL ENCOUNTER (OUTPATIENT)
Facility: MEDICAL CENTER | Age: 19
End: 2024-07-27
Attending: OBSTETRICS & GYNECOLOGY | Admitting: OBSTETRICS & GYNECOLOGY
Payer: COMMERCIAL

## 2024-07-27 VITALS
OXYGEN SATURATION: 96 % | HEART RATE: 93 BPM | SYSTOLIC BLOOD PRESSURE: 114 MMHG | RESPIRATION RATE: 16 BRPM | WEIGHT: 200 LBS | HEIGHT: 64 IN | BODY MASS INDEX: 34.15 KG/M2 | DIASTOLIC BLOOD PRESSURE: 66 MMHG

## 2024-07-27 LAB
APPEARANCE UR: ABNORMAL
COLOR UR AUTO: YELLOW
GLUCOSE UR QL STRIP.AUTO: NEGATIVE MG/DL
KETONES UR QL STRIP.AUTO: NEGATIVE MG/DL
LEUKOCYTE ESTERASE UR QL STRIP.AUTO: ABNORMAL
NITRITE UR QL STRIP.AUTO: NEGATIVE
PH UR STRIP.AUTO: 7 [PH] (ref 5–8)
PROT UR QL STRIP: ABNORMAL MG/DL
RBC UR QL AUTO: ABNORMAL
SP GR UR STRIP.AUTO: 1.02 (ref 1–1.03)

## 2024-07-27 PROCEDURE — 99282 EMERGENCY DEPT VISIT SF MDM: CPT | Mod: 25 | Performed by: OBSTETRICS & GYNECOLOGY

## 2024-07-27 PROCEDURE — 59025 FETAL NON-STRESS TEST: CPT | Mod: 26 | Performed by: OBSTETRICS & GYNECOLOGY

## 2024-07-27 PROCEDURE — 81002 URINALYSIS NONAUTO W/O SCOPE: CPT

## 2024-07-27 PROCEDURE — 99282 EMERGENCY DEPT VISIT SF MDM: CPT | Mod: 25

## 2024-07-27 PROCEDURE — 59025 FETAL NON-STRESS TEST: CPT

## 2024-07-27 ASSESSMENT — PAIN SCALES - GENERAL: PAINLEVEL: 4

## 2024-07-28 NOTE — ED PROVIDER NOTES
"OB ED EVALUATION NOTE  LUQ  SUBJECTIVE:  Mee Ko is a 19 y.o.,  at 35w0d who presents for LUQ pain for four days. Pt notes it is worse with bending over or changing position such as laying down or getting up.  Pt denies any trauma t the area.  Her pregnancy has been relatively normal.  She denies vaginal bleeding, leakage of fluid, or contractions. She states the baby is moving.     I personally reviewed the past medical and surgical histories, as well as the problem list.    OBJECTIVE:  Vital Signs: There were no vitals filed for this visit.  GEN: NAD  Abd: soft, NT, gravid, tenderness it specifically elicited with palpation of the lower ribs, especially at the costochondral cartilage juncture.  Ext: no edema    Pelvic:   SSE: deferred    NST read: NST REACTIVE, baseline 140 BPM, moderate variability, accels 2 or more in 20 min  [place]: ni CTX    LABS / IMAGING:  Results for orders placed or performed during the hospital encounter of 24   POCT urinalysis device results   Result Value Ref Range    POC Color Yellow     POC Appearance Cloudy (A)     POC Glucose Negative Negative mg/dL    POC Ketones Negative Negative mg/dL    POC Specific Gravity 1.020 1.005 - 1.030    POC Blood Trace-intact (A) Negative    POC Urine PH 7.0 5.0 - 8.0    POC Protein Trace (A) Negative mg/dL    POC Nitrites Negative Negative    POC Leukocyte Esterase Trace (A) Negative         ASSESSMENT AND PLAN:  19 y.o.    Patient Active Problem List    Diagnosis Date Noted    Supervision of other normal pregnancy, antepartum 2024     Pt is a 18 yo 5\"4\" tall  at 35 weeks EGA with rib pain in the LUQ. We discuss the pathophysiology of this pain in pregnancy. It will likely persist until delivery although it might get better when/if baby drops.  1  The patient was instructed to follow up in the office in 1 weeks. She was counseled to call or return for vaginal bleeding, regular contractions, leakage of fluid or " decreased fetal movement.    Keke Coles M.D.

## 2024-07-28 NOTE — PROGRESS NOTES
: Pt arrives to Pt presents to triage for Left upper quadrant pain consisent for the past 3-4 days.   Pt states no headache, no visual changes today but was at work this week with complaint of some spots in vision, no acute edema, and no RUQ pain.    Pt states she is not feeling any contractions, no leaking of fluid,  no vaginal bleeding, and fetal movement is normal.    :Report to [facility] midwife    : Dr. Coles at bedside   Orders for discharge received     : Pt given AVS. This RN provided  labor precaution and abdominal pain during pregnancy education.   States understanding and has no further questions at this time.   Pt and FOB ambulate off floor independently

## 2024-08-12 ENCOUNTER — TELEPHONE (OUTPATIENT)
Dept: OBGYN | Facility: CLINIC | Age: 19
End: 2024-08-12
Payer: COMMERCIAL

## 2024-08-12 NOTE — TELEPHONE ENCOUNTER
Phone Number Called: [phone]    Call outcome: Spoke to patient regarding message below.    Message: Called to let pt know I completed her disability paperwork and scanned it into her chart. Pt wants to  paperwork in person so she can email them to her employer. Paperwork was not faxed but placed in my folder at the  for pt to . Stated she will come to pick them up today. Call ended.

## 2024-08-15 ENCOUNTER — ROUTINE PRENATAL (OUTPATIENT)
Dept: OBGYN | Facility: CLINIC | Age: 19
End: 2024-08-15
Payer: COMMERCIAL

## 2024-08-15 ENCOUNTER — HOSPITAL ENCOUNTER (OUTPATIENT)
Facility: MEDICAL CENTER | Age: 19
End: 2024-08-15
Attending: NURSE PRACTITIONER
Payer: COMMERCIAL

## 2024-08-15 VITALS — BODY MASS INDEX: 35.87 KG/M2 | DIASTOLIC BLOOD PRESSURE: 68 MMHG | SYSTOLIC BLOOD PRESSURE: 118 MMHG | WEIGHT: 209 LBS

## 2024-08-15 DIAGNOSIS — Z34.80 SUPERVISION OF OTHER NORMAL PREGNANCY, ANTEPARTUM: Primary | ICD-10-CM

## 2024-08-15 DIAGNOSIS — Z34.80 SUPERVISION OF OTHER NORMAL PREGNANCY, ANTEPARTUM: ICD-10-CM

## 2024-08-15 PROCEDURE — 87150 DNA/RNA AMPLIFIED PROBE: CPT

## 2024-08-15 PROCEDURE — 3074F SYST BP LT 130 MM HG: CPT | Performed by: NURSE PRACTITIONER

## 2024-08-15 PROCEDURE — 87081 CULTURE SCREEN ONLY: CPT

## 2024-08-15 PROCEDURE — 3078F DIAST BP <80 MM HG: CPT | Performed by: NURSE PRACTITIONER

## 2024-08-15 PROCEDURE — 0502F SUBSEQUENT PRENATAL CARE: CPT | Performed by: NURSE PRACTITIONER

## 2024-08-15 NOTE — PROGRESS NOTES
OB follow up   + fetal movement.  No VB, LOF or UC's.  Phone # [phone]  Preferred pharmacy confirmed.  GBS today

## 2024-08-16 LAB — GP B STREP DNA SPEC QL NAA+PROBE: POSITIVE

## 2024-08-18 PROBLEM — O99.820 GROUP B STREPTOCOCCUS CARRIER, +RV CULTURE, CURRENTLY PREGNANT: Status: ACTIVE | Noted: 2024-08-18

## 2024-08-22 ENCOUNTER — ROUTINE PRENATAL (OUTPATIENT)
Dept: OBGYN | Facility: CLINIC | Age: 19
End: 2024-08-22
Payer: COMMERCIAL

## 2024-08-22 VITALS — DIASTOLIC BLOOD PRESSURE: 58 MMHG | SYSTOLIC BLOOD PRESSURE: 110 MMHG | BODY MASS INDEX: 35.7 KG/M2 | WEIGHT: 208 LBS

## 2024-08-22 DIAGNOSIS — Z34.80 SUPERVISION OF OTHER NORMAL PREGNANCY, ANTEPARTUM: ICD-10-CM

## 2024-08-22 PROCEDURE — 3074F SYST BP LT 130 MM HG: CPT | Performed by: NURSE PRACTITIONER

## 2024-08-22 PROCEDURE — 0502F SUBSEQUENT PRENATAL CARE: CPT | Performed by: NURSE PRACTITIONER

## 2024-08-22 PROCEDURE — 3078F DIAST BP <80 MM HG: CPT | Performed by: NURSE PRACTITIONER

## 2024-08-22 NOTE — PROGRESS NOTES
SUBJECTIVE:  Pt is a 19 y.o.   at 38w5d  gestation. Presents today for follow-up prenatal care. Reports good  fetal movement. Denies regular cramping/contractions, bleeding or leaking of fluid. Denies dysuria, headaches, N/V. Generally feels well today.     OBJECTIVE:  - See prenatal vitals flow  -   Vitals:    24 1036   BP: 110/58   Weight: 208 lb                 ASSESSMENT:   - IUP at 38w5d    - S=D   -   Patient Active Problem List    Diagnosis Date Noted    Group B Streptococcus carrier, +RV culture, currently pregnant 2024    Supervision of other normal pregnancy, antepartum 2024         PLAN:  - S/sx pregnancy and labor warning signs vs general discomforts discussed  - Fetal movements and/or kick counts reviewed   - Adequate hydration reinforced  - Nutrition/exercise/vitamin education; continue PNV  - Declines cervical exam today as no pain so far, may want IOL at 40 weeks if favorable but will plan for 41 weeks at this point  - Anticipatory guidance given  - RTC in 1 weeks for follow-up prenatal care

## 2024-08-22 NOTE — PROGRESS NOTES
Pt. Here for OB/FU.   Reports Good FM.    Chaperone offered.   Pt states she has no concerns.   Phone/Pharm verified.

## 2024-08-29 ENCOUNTER — ROUTINE PRENATAL (OUTPATIENT)
Dept: OBGYN | Facility: CLINIC | Age: 19
End: 2024-08-29
Payer: COMMERCIAL

## 2024-08-29 VITALS — DIASTOLIC BLOOD PRESSURE: 70 MMHG | SYSTOLIC BLOOD PRESSURE: 116 MMHG | BODY MASS INDEX: 36.49 KG/M2 | WEIGHT: 212.6 LBS

## 2024-08-29 DIAGNOSIS — Z34.80 SUPERVISION OF OTHER NORMAL PREGNANCY, ANTEPARTUM: ICD-10-CM

## 2024-08-29 NOTE — PROGRESS NOTES
Pt has no complaints with cramping, UCs, Vb, LOF though occ tightening at night. +FM. GBS+ - pt aware of results. IOL 9/3 at 9am per pt request - states she really doesn't want to wait for 41wk due to discomfort. Cervix: 1/80/-2, post, soft, vtx. Labor precautions reviewed. Daily FKC recommended. RTC 1 w or sooner prn.

## 2024-08-29 NOTE — PROGRESS NOTES
Pt here for OB follow-up  Reports +FM  No VB, LOF, or UC  Phone number: [phone]  Pharmacy verified with pt.  Pt states some jade betts, lots of pelvic pressure   Cervical check today   GBS (+)

## 2024-09-03 ENCOUNTER — HOSPITAL ENCOUNTER (INPATIENT)
Facility: MEDICAL CENTER | Age: 19
LOS: 2 days | End: 2024-09-05
Attending: STUDENT IN AN ORGANIZED HEALTH CARE EDUCATION/TRAINING PROGRAM | Admitting: STUDENT IN AN ORGANIZED HEALTH CARE EDUCATION/TRAINING PROGRAM
Payer: COMMERCIAL

## 2024-09-03 ENCOUNTER — ANESTHESIA (OUTPATIENT)
Dept: ANESTHESIOLOGY | Facility: MEDICAL CENTER | Age: 19
End: 2024-09-03
Payer: COMMERCIAL

## 2024-09-03 ENCOUNTER — APPOINTMENT (OUTPATIENT)
Dept: OBGYN | Facility: MEDICAL CENTER | Age: 19
End: 2024-09-03
Attending: STUDENT IN AN ORGANIZED HEALTH CARE EDUCATION/TRAINING PROGRAM
Payer: COMMERCIAL

## 2024-09-03 ENCOUNTER — ANESTHESIA EVENT (OUTPATIENT)
Dept: ANESTHESIOLOGY | Facility: MEDICAL CENTER | Age: 19
End: 2024-09-03
Payer: COMMERCIAL

## 2024-09-03 PROBLEM — Z34.90 ENCOUNTER FOR INDUCTION OF LABOR: Status: ACTIVE | Noted: 2024-09-03

## 2024-09-03 LAB
ALBUMIN SERPL BCP-MCNC: 3.2 G/DL (ref 3.2–4.9)
ALBUMIN/GLOB SERPL: 1.1 G/DL
ALP SERPL-CCNC: 167 U/L (ref 30–99)
ALT SERPL-CCNC: 10 U/L (ref 2–50)
ANION GAP SERPL CALC-SCNC: 13 MMOL/L (ref 7–16)
AST SERPL-CCNC: 17 U/L (ref 12–45)
BASOPHILS # BLD AUTO: 0.5 % (ref 0–1.8)
BASOPHILS # BLD: 0.06 K/UL (ref 0–0.12)
BILIRUB SERPL-MCNC: 0.4 MG/DL (ref 0.1–1.5)
BUN SERPL-MCNC: 8 MG/DL (ref 8–22)
CALCIUM ALBUM COR SERPL-MCNC: 9.6 MG/DL (ref 8.5–10.5)
CALCIUM SERPL-MCNC: 9 MG/DL (ref 8.5–10.5)
CHLORIDE SERPL-SCNC: 106 MMOL/L (ref 96–112)
CO2 SERPL-SCNC: 18 MMOL/L (ref 20–33)
CREAT SERPL-MCNC: 0.46 MG/DL (ref 0.5–1.4)
CREAT UR-MCNC: 66.57 MG/DL
EOSINOPHIL # BLD AUTO: 0.1 K/UL (ref 0–0.51)
EOSINOPHIL NFR BLD: 0.8 % (ref 0–6.9)
ERYTHROCYTE [DISTWIDTH] IN BLOOD BY AUTOMATED COUNT: 43.2 FL (ref 35.9–50)
GFR SERPLBLD CREATININE-BSD FMLA CKD-EPI: 139 ML/MIN/1.73 M 2
GLOBULIN SER CALC-MCNC: 3 G/DL (ref 1.9–3.5)
GLUCOSE SERPL-MCNC: 73 MG/DL (ref 65–99)
HCT VFR BLD AUTO: 36.2 % (ref 37–47)
HGB BLD-MCNC: 12.5 G/DL (ref 12–16)
HOLDING TUBE BB 8507: NORMAL
IMM GRANULOCYTES # BLD AUTO: 0.22 K/UL (ref 0–0.11)
IMM GRANULOCYTES NFR BLD AUTO: 1.8 % (ref 0–0.9)
LYMPHOCYTES # BLD AUTO: 1.42 K/UL (ref 1–4.8)
LYMPHOCYTES NFR BLD: 11.5 % (ref 22–41)
MCH RBC QN AUTO: 29.8 PG (ref 27–33)
MCHC RBC AUTO-ENTMCNC: 34.5 G/DL (ref 32.2–35.5)
MCV RBC AUTO: 86.2 FL (ref 81.4–97.8)
MONOCYTES # BLD AUTO: 0.9 K/UL (ref 0–0.85)
MONOCYTES NFR BLD AUTO: 7.3 % (ref 0–13.4)
NEUTROPHILS # BLD AUTO: 9.64 K/UL (ref 1.82–7.42)
NEUTROPHILS NFR BLD: 78.1 % (ref 44–72)
NRBC # BLD AUTO: 0 K/UL
NRBC BLD-RTO: 0 /100 WBC (ref 0–0.2)
PLATELET # BLD AUTO: 198 K/UL (ref 164–446)
PMV BLD AUTO: 11.4 FL (ref 9–12.9)
POTASSIUM SERPL-SCNC: 4 MMOL/L (ref 3.6–5.5)
PROT SERPL-MCNC: 6.2 G/DL (ref 6–8.2)
PROT UR-MCNC: 11 MG/DL (ref 0–15)
PROT/CREAT UR: 165 MG/G (ref 10–107)
RBC # BLD AUTO: 4.2 M/UL (ref 4.2–5.4)
SODIUM SERPL-SCNC: 137 MMOL/L (ref 135–145)
T PALLIDUM AB SER QL IA: NORMAL
WBC # BLD AUTO: 12.3 K/UL (ref 4.8–10.8)

## 2024-09-03 PROCEDURE — 700111 HCHG RX REV CODE 636 W/ 250 OVERRIDE (IP): Performed by: ANESTHESIOLOGY

## 2024-09-03 PROCEDURE — 700111 HCHG RX REV CODE 636 W/ 250 OVERRIDE (IP): Performed by: STUDENT IN AN ORGANIZED HEALTH CARE EDUCATION/TRAINING PROGRAM

## 2024-09-03 PROCEDURE — A9270 NON-COVERED ITEM OR SERVICE: HCPCS | Performed by: STUDENT IN AN ORGANIZED HEALTH CARE EDUCATION/TRAINING PROGRAM

## 2024-09-03 PROCEDURE — 700105 HCHG RX REV CODE 258: Performed by: STUDENT IN AN ORGANIZED HEALTH CARE EDUCATION/TRAINING PROGRAM

## 2024-09-03 PROCEDURE — 700105 HCHG RX REV CODE 258: Performed by: NURSE PRACTITIONER

## 2024-09-03 PROCEDURE — 82570 ASSAY OF URINE CREATININE: CPT

## 2024-09-03 PROCEDURE — 700111 HCHG RX REV CODE 636 W/ 250 OVERRIDE (IP): Performed by: NURSE PRACTITIONER

## 2024-09-03 PROCEDURE — 303615 HCHG EPIDURAL/SPINAL ANESTHESIA FOR LABOR

## 2024-09-03 PROCEDURE — 99999 PR NO CHARGE: CPT | Performed by: STUDENT IN AN ORGANIZED HEALTH CARE EDUCATION/TRAINING PROGRAM

## 2024-09-03 PROCEDURE — 700102 HCHG RX REV CODE 250 W/ 637 OVERRIDE(OP): Performed by: STUDENT IN AN ORGANIZED HEALTH CARE EDUCATION/TRAINING PROGRAM

## 2024-09-03 PROCEDURE — 80053 COMPREHEN METABOLIC PANEL: CPT

## 2024-09-03 PROCEDURE — 84156 ASSAY OF PROTEIN URINE: CPT

## 2024-09-03 PROCEDURE — 770002 HCHG ROOM/CARE - OB PRIVATE (112)

## 2024-09-03 PROCEDURE — 85025 COMPLETE CBC W/AUTO DIFF WBC: CPT

## 2024-09-03 PROCEDURE — 36415 COLL VENOUS BLD VENIPUNCTURE: CPT

## 2024-09-03 PROCEDURE — 700111 HCHG RX REV CODE 636 W/ 250 OVERRIDE (IP)

## 2024-09-03 PROCEDURE — 86780 TREPONEMA PALLIDUM: CPT

## 2024-09-03 RX ORDER — SODIUM CHLORIDE, SODIUM LACTATE, POTASSIUM CHLORIDE, AND CALCIUM CHLORIDE .6; .31; .03; .02 G/100ML; G/100ML; G/100ML; G/100ML
250 INJECTION, SOLUTION INTRAVENOUS PRN
OUTPATIENT
Start: 2024-09-03

## 2024-09-03 RX ORDER — IBUPROFEN 800 MG/1
800 TABLET, FILM COATED ORAL
Status: DISCONTINUED | OUTPATIENT
Start: 2024-09-03 | End: 2024-09-04 | Stop reason: HOSPADM

## 2024-09-03 RX ORDER — ROPIVACAINE HYDROCHLORIDE 2 MG/ML
INJECTION, SOLUTION EPIDURAL; INFILTRATION; PERINEURAL
Status: COMPLETED
Start: 2024-09-03 | End: 2024-09-03

## 2024-09-03 RX ORDER — SODIUM CHLORIDE, SODIUM LACTATE, POTASSIUM CHLORIDE, CALCIUM CHLORIDE 600; 310; 30; 20 MG/100ML; MG/100ML; MG/100ML; MG/100ML
INJECTION, SOLUTION INTRAVENOUS CONTINUOUS
Status: DISCONTINUED | OUTPATIENT
Start: 2024-09-03 | End: 2024-09-05 | Stop reason: HOSPADM

## 2024-09-03 RX ORDER — BUPIVACAINE HYDROCHLORIDE 2.5 MG/ML
INJECTION, SOLUTION EPIDURAL; INFILTRATION; INTRACAUDAL PRN
Status: DISCONTINUED | OUTPATIENT
Start: 2024-09-03 | End: 2024-09-04 | Stop reason: SURG

## 2024-09-03 RX ORDER — BUPIVACAINE HYDROCHLORIDE 2.5 MG/ML
INJECTION, SOLUTION EPIDURAL; INFILTRATION; INTRACAUDAL
Status: COMPLETED
Start: 2024-09-03 | End: 2024-09-03

## 2024-09-03 RX ORDER — LIDOCAINE HYDROCHLORIDE 10 MG/ML
20 INJECTION, SOLUTION INFILTRATION; PERINEURAL
Status: DISCONTINUED | OUTPATIENT
Start: 2024-09-03 | End: 2024-09-04 | Stop reason: HOSPADM

## 2024-09-03 RX ORDER — EPHEDRINE SULFATE 50 MG/ML
5 INJECTION, SOLUTION INTRAVENOUS
OUTPATIENT
Start: 2024-09-03

## 2024-09-03 RX ORDER — TERBUTALINE SULFATE 1 MG/ML
0.25 INJECTION, SOLUTION SUBCUTANEOUS
Status: DISCONTINUED | OUTPATIENT
Start: 2024-09-03 | End: 2024-09-04 | Stop reason: HOSPADM

## 2024-09-03 RX ORDER — OXYTOCIN 10 [USP'U]/ML
10 INJECTION, SOLUTION INTRAMUSCULAR; INTRAVENOUS
Status: DISCONTINUED | OUTPATIENT
Start: 2024-09-03 | End: 2024-09-04 | Stop reason: HOSPADM

## 2024-09-03 RX ORDER — SODIUM CHLORIDE, SODIUM LACTATE, POTASSIUM CHLORIDE, AND CALCIUM CHLORIDE .6; .31; .03; .02 G/100ML; G/100ML; G/100ML; G/100ML
1000 INJECTION, SOLUTION INTRAVENOUS
OUTPATIENT
Start: 2024-09-03

## 2024-09-03 RX ORDER — ACETAMINOPHEN 500 MG
1000 TABLET ORAL
Status: DISCONTINUED | OUTPATIENT
Start: 2024-09-03 | End: 2024-09-04 | Stop reason: HOSPADM

## 2024-09-03 RX ORDER — ROPIVACAINE HYDROCHLORIDE 2 MG/ML
INJECTION, SOLUTION EPIDURAL; INFILTRATION; PERINEURAL CONTINUOUS
OUTPATIENT
Start: 2024-09-04

## 2024-09-03 RX ADMIN — AMPICILLIN SODIUM 1 G: 1 INJECTION, POWDER, FOR SOLUTION INTRAMUSCULAR; INTRAVENOUS at 23:52

## 2024-09-03 RX ADMIN — OXYTOCIN 2 MILLI-UNITS/MIN: 10 INJECTION, SOLUTION INTRAMUSCULAR; INTRAVENOUS at 20:27

## 2024-09-03 RX ADMIN — AMPICILLIN SODIUM 2 G: 2 INJECTION, POWDER, FOR SOLUTION INTRAVENOUS at 19:47

## 2024-09-03 RX ADMIN — MISOPROSTOL 25 MCG: 100 TABLET ORAL at 12:04

## 2024-09-03 RX ADMIN — BUPIVACAINE HYDROCHLORIDE 5 ML: 2.5 INJECTION, SOLUTION EPIDURAL; INFILTRATION; INTRACAUDAL at 23:48

## 2024-09-03 RX ADMIN — FENTANYL CITRATE 100 MCG: 50 INJECTION, SOLUTION INTRAMUSCULAR; INTRAVENOUS at 23:48

## 2024-09-03 RX ADMIN — SODIUM CHLORIDE, POTASSIUM CHLORIDE, SODIUM LACTATE AND CALCIUM CHLORIDE: 600; 310; 30; 20 INJECTION, SOLUTION INTRAVENOUS at 11:45

## 2024-09-03 RX ADMIN — ROPIVACAINE HYDROCHLORIDE 200 MG: 2 INJECTION EPIDURAL; INFILTRATION; PERINEURAL at 23:30

## 2024-09-03 ASSESSMENT — PAIN SCALES - GENERAL: PAINLEVEL: 0 - NO PAIN

## 2024-09-03 ASSESSMENT — PATIENT HEALTH QUESTIONNAIRE - PHQ9
2. FEELING DOWN, DEPRESSED, IRRITABLE, OR HOPELESS: NOT AT ALL
1. LITTLE INTEREST OR PLEASURE IN DOING THINGS: NOT AT ALL
SUM OF ALL RESPONSES TO PHQ9 QUESTIONS 1 AND 2: 0

## 2024-09-03 ASSESSMENT — LIFESTYLE VARIABLES
HAVE PEOPLE ANNOYED YOU BY CRITICIZING YOUR DRINKING: NO
EVER HAD A DRINK FIRST THING IN THE MORNING TO STEADY YOUR NERVES TO GET RID OF A HANGOVER: NO
DOES PATIENT WANT TO STOP DRINKING: NO
EVER FELT BAD OR GUILTY ABOUT YOUR DRINKING: NO
TOTAL SCORE: 0
AVERAGE NUMBER OF DAYS PER WEEK YOU HAVE A DRINK CONTAINING ALCOHOL: 0
ALCOHOL_USE: NO
EVER_SMOKED: NEVER
TOTAL SCORE: 0
HOW MANY TIMES IN THE PAST YEAR HAVE YOU HAD 5 OR MORE DRINKS IN A DAY: 0
CONSUMPTION TOTAL: NEGATIVE
HAVE YOU EVER FELT YOU SHOULD CUT DOWN ON YOUR DRINKING: NO
TOTAL SCORE: 0
ON A TYPICAL DAY WHEN YOU DRINK ALCOHOL HOW MANY DRINKS DO YOU HAVE: 0

## 2024-09-03 NOTE — H&P
History and Physical      Mee Gia Ko is a 19 y.o. year old female  at 40w3d, dated by LMP consistent with 7w US, who presents for scheduled induction of labor. She is feeling overall well. No complaints. No contractions. No vaginal bleeding. No LOF. Feels frequent fetal movement.      ROS: A comprehensive review of systems was negative.    History reviewed. No pertinent past medical history.  History reviewed. No pertinent surgical history.  OB History    Para Term  AB Living   3 0     2     SAB IAB Ectopic Molar Multiple Live Births   1 1              # Outcome Date GA Lbr Alverto/2nd Weight Sex Type Anes PTL Lv   3 Current            2 SAB 2022 4w0d             Birth Comments: Passed on its own   1 IAB 2022     TAB         Obstetric Comments   Surgical  at age 17 at 8 weeks     Social History     Socioeconomic History    Marital status: Single     Spouse name: Not on file    Number of children: Not on file    Years of education: Not on file    Highest education level: High school graduate   Occupational History     Comment: Currently unemployed, previously in    Tobacco Use    Smoking status: Never    Smokeless tobacco: Never   Vaping Use    Vaping status: Never Used   Substance and Sexual Activity    Alcohol use: Never    Drug use: Not Currently     Types: Marijuana     Comment: smoked consistently in 11-17; decreased since then. Last use 10/2023    Sexual activity: Yes     Partners: Male     Comment: Monogamous relationship   Other Topics Concern    Not on file   Social History Narrative    Lives with partner/FOB; 2 nikolay jules     Social Determinants of Health     Financial Resource Strain: Not on file   Food Insecurity: Not on file   Transportation Needs: Not on file   Physical Activity: Not on file   Stress: Not on file   Social Connections: Not on file   Intimate Partner Violence: Not on file   Housing Stability: Not on file     Allergies: Patient has no  "known allergies.    Current Facility-Administered Medications:     LR infusion, , Intravenous, Continuous, Bessie Navarrete M.D., Last Rate: 125 mL/hr at 09/03/24 1145, New Bag at 09/03/24 1145    lidocaine (XYLOCAINE) 1%  injection, 20 mL, Subcutaneous, Once PRN, Bessie Navarrete M.D.    terbutaline (Brethine) injection 0.25 mg, 0.25 mg, Subcutaneous, Once PRN, Bessie Navarrete M.D.    oxytocin (Pitocin) infusion bolus (for post delivery), 20 Units, Intravenous, Once **FOLLOWED BY** oxytocin (Pitocin) infusion (for post delivery), 125 mL/hr, Intravenous, Continuous, Bessie Navarrete M.D.    oxytocin (Pitocin) injection 10 Units, 10 Units, Intramuscular, Once PRN, Bessie Navarrete M.D.    ibuprofen (Motrin) tablet 800 mg, 800 mg, Oral, Once PRN, Bessie Navarrete M.D.    acetaminophen (Tylenol) tablet 1,000 mg, 1,000 mg, Oral, Once PRN, Bessie Navarrete M.D.    miSOPROStol (Cytotec) tablet 25 mcg, 25 mcg, Vaginal, Q4HRS PRN, Bessie Navarrete M.D.    ampicillin (Omnipen) 2 g in  mL IVPB, 2 g, Intravenous, Once, Held at 09/03/24 1145 **FOLLOWED BY** ampicillin (Omnipen) 1 g in  mL IVPB, 1 g, Intravenous, Q4HR, Bessie Navarrete M.D.    Prenatal care with [facility] starting at 17w with following problems:  Patient Active Problem List    Diagnosis Date Noted    Encounter for induction of labor 09/03/2024    Group B Streptococcus carrier, +RV culture, currently pregnant 08/18/2024    Supervision of other normal pregnancy, antepartum 03/26/2024       Objective:      /77   Pulse (!) 101   Temp 36.3 °C (97.3 °F) (Temporal)   Resp 16   Ht 1.626 m (5' 4\")   Wt 97 kg (213 lb 13.5 oz)   SpO2 (!) 87%   GENERAL: Alert, in no apparent distress  PSYCHIATRIC: Appropriate affect, intact insight and judgement.  HEENT: PERRLA, EOMI, no scleral icterus  RESPIRATORY: Normal respiratory effort.  Lungs clear to auscultation.   CARDIOVASCULAR: RRR  ABDOMEN: Soft, gravid, nontender.  EXTREMITIES: No edema, " calves NT  SKIN: No rash    SVE: 2cm/70%/--2    EFM:   Baseline 125 bpm, moderate variability, + accels, no decels   [place]: CTX q 7-10 minutes    Lab:   Blood type: A     Recent Results (from the past 5880 hour(s))   PRENATAL PANEL 3+HIV+HCV    Collection Time: 03/22/24 12:24 PM   Result Value Ref Range    WBC 13.8 (H) 4.8 - 10.8 K/uL    RBC 4.47 4.20 - 5.40 M/uL    Hemoglobin 13.6 12.0 - 16.0 g/dL    Hematocrit 39.2 37.0 - 47.0 %    MCV 87.7 81.4 - 97.8 fL    MCH 30.4 27.0 - 33.0 pg    MCHC 34.7 32.2 - 35.5 g/dL    RDW 44.9 35.9 - 50.0 fL    Platelet Count 216 164 - 446 K/uL    MPV 11.0 9.0 - 12.9 fL    Neutrophils-Polys 73.60 (H) 44.00 - 72.00 %    Lymphocytes 15.60 (L) 22.00 - 41.00 %    Monocytes 6.10 0.00 - 13.40 %    Eosinophils 2.40 0.00 - 6.90 %    Basophils 0.60 0.00 - 1.80 %    Immature Granulocytes 1.70 (H) 0.00 - 0.90 %    Nucleated RBC 0.00 0.00 - 0.20 /100 WBC    Neutrophils (Absolute) 10.13 (H) 1.82 - 7.42 K/uL    Lymphs (Absolute) 2.15 1.00 - 4.80 K/uL    Monos (Absolute) 0.84 0.00 - 0.85 K/uL    Eos (Absolute) 0.33 0.00 - 0.51 K/uL    Baso (Absolute) 0.08 0.00 - 0.12 K/uL    Immature Granulocytes (abs) 0.24 (H) 0.00 - 0.11 K/uL    NRBC (Absolute) 0.00 K/uL    Rubella IgG Antibody 63.90 IU/mL    Hepatitis B Surface Antigen Non-Reactive Non-Reactive    Hepatitis C Antibody Non-Reactive Non-Reactive    Syphilis, Treponemal Qual Non-Reactive Non-Reactive   HIV AG/AB COMBO ASSAY SCREENING    Collection Time: 03/22/24 12:24 PM   Result Value Ref Range    HIV Ag/Ab Combo Assay Non-Reactive Non Reactive   OP Prenatal Panel-Blood Bank    Collection Time: 03/22/24 12:24 PM   Result Value Ref Range    ABO Grouping Only A     Rh Grouping Only POS     Antibody Screen Scrn NEG    POCT urinalysis device results    Collection Time: 04/11/24 10:43 PM   Result Value Ref Range    POC Color Yellow     POC Appearance Clear     POC Glucose Negative Negative mg/dL    POC Ketones 80 (A) Negative mg/dL    POC Specific  Gravity >=1.030 (A) 1.005 - 1.030    POC Blood Negative Negative    POC Urine PH 6.0 5.0 - 8.0    POC Protein 30 (A) Negative mg/dL    POC Nitrites Negative Negative    POC Leukocyte Esterase Negative Negative   AFP TETRA    Collection Time: 05/01/24  1:45 PM   Result Value Ref Range    AFP Value -Eia 76 ng/mL    AFP MOM Value 0.90     Ue3 Value 3.05 ng/mL    Ue3 Mom 0.70     Patient's hCG, 2nd Trimester 78424 IU/L    hCG MoM, 2nd Trimester 1.56     Sabine Value -Eia 155 pg/mL    Sabine Mom Value 0.76     Interpretation Screen Neg     [age] WILMER [age] yr    Maternal Weight 165.0 lbs.     Gest. Age on Collection Date 22 wks, 4 days     Gestational Age Based On Other     Multiple Pregnancy Velasquez     Race Unknown     Insulin Dependent Diabetes No     Smoking No     Family Hx NTD No     Family Hx of Aneuploidy No     Specimen See Note     EER Quad, Maternal Serum See Note    CBC WITHOUT DIFFERENTIAL    Collection Time: 06/21/24  8:35 AM   Result Value Ref Range    WBC 13.1 (H) 4.8 - 10.8 K/uL    RBC 4.18 (L) 4.20 - 5.40 M/uL    Hemoglobin 12.4 12.0 - 16.0 g/dL    Hematocrit 37.5 37.0 - 47.0 %    MCV 89.7 81.4 - 97.8 fL    MCH 29.7 27.0 - 33.0 pg    MCHC 33.1 32.2 - 35.5 g/dL    RDW 42.6 35.9 - 50.0 fL    Platelet Count 224 164 - 446 K/uL    MPV 10.7 9.0 - 12.9 fL   T.PALLIDUM AB KG (SCREENING)    Collection Time: 06/21/24  8:35 AM   Result Value Ref Range    Syphilis, Treponemal Qual Non-Reactive Non-Reactive   GLUCOSE 1HR GESTATIONAL    Collection Time: 06/21/24  8:36 AM   Result Value Ref Range    Glucose, Post Dose 124 70 - 139 mg/dL   Chlamydia/GC, PCR (Urine)    Collection Time: 07/25/24  9:53 AM    Specimen: Urine   Result Value Ref Range    C. trachomatis by PCR Negative Negative    Gc By Dna Probe Negative Negative    Source Urine    POCT urinalysis device results    Collection Time: 07/27/24  7:47 PM   Result Value Ref Range    POC Color Yellow     POC Appearance Cloudy (A)     POC Glucose Negative  Negative mg/dL    POC Ketones Negative Negative mg/dL    POC Specific Gravity 1.020 1.005 - 1.030    POC Blood Trace-intact (A) Negative    POC Urine PH 7.0 5.0 - 8.0    POC Protein Trace (A) Negative mg/dL    POC Nitrites Negative Negative    POC Leukocyte Esterase Trace (A) Negative   GRP B STREP, BY PCR (VELARDE BROTH)    Collection Time: 08/15/24  2:32 PM    Specimen: Genital   Result Value Ref Range    Strep Gp B DNA PCR POSITIVE (A) Negative   CBC with differential    Collection Time: 24 11:20 AM   Result Value Ref Range    WBC 12.3 (H) 4.8 - 10.8 K/uL    RBC 4.20 4.20 - 5.40 M/uL    Hemoglobin 12.5 12.0 - 16.0 g/dL    Hematocrit 36.2 (L) 37.0 - 47.0 %    MCV 86.2 81.4 - 97.8 fL    MCH 29.8 27.0 - 33.0 pg    MCHC 34.5 32.2 - 35.5 g/dL    RDW 43.2 35.9 - 50.0 fL    Platelet Count 198 164 - 446 K/uL    MPV 11.4 9.0 - 12.9 fL    Neutrophils-Polys 78.10 (H) 44.00 - 72.00 %    Lymphocytes 11.50 (L) 22.00 - 41.00 %    Monocytes 7.30 0.00 - 13.40 %    Eosinophils 0.80 0.00 - 6.90 %    Basophils 0.50 0.00 - 1.80 %    Immature Granulocytes 1.80 (H) 0.00 - 0.90 %    Nucleated RBC 0.00 0.00 - 0.20 /100 WBC    Neutrophils (Absolute) 9.64 (H) 1.82 - 7.42 K/uL    Lymphs (Absolute) 1.42 1.00 - 4.80 K/uL    Monos (Absolute) 0.90 (H) 0.00 - 0.85 K/uL    Eos (Absolute) 0.10 0.00 - 0.51 K/uL    Baso (Absolute) 0.06 0.00 - 0.12 K/uL    Immature Granulocytes (abs) 0.22 (H) 0.00 - 0.11 K/uL    NRBC (Absolute) 0.00 K/uL        Assessment:   Mee Gia Salinaswin is a 18yo  at 40w3d, dated by LMP consistent with 7w US, who presented for elective induction of labor.     Plan:       #Labor   - Extensive discussion with Mee about labor induction process. Discussed methods of cervical ripening (mechanical dilation with balloon vs. Used of prostaglandins - misoprostol vs. Dinoprostone). Discussed advantages, r/b/a of each method. With flynn balloon, we often start Pitocin simultaneously and increased per nursing protocol in  "effort to achieve adequate contraction pattern. Misoprostol has the benefit of being less invasive/uncomfortable during placement and can induce uterine contractility on its own; however, there is not an \"off switch,\" and if misoprostol has completely dissolved through the vaginal mucosa, there is not away to reverse its effect in event of tachysystole/fetal distress. Patient would like to proceed with Misoprostol as this time, she is open to repeat examination in ~4h to assess for efficacy of chosen method and consideration of balloon placement.  - Plans for epidural when needed for pain control  - GBS+: Ampicillin with ROM or in active labor    #FWB: Cat 1 tracing    #Contraception: Considering postpartum nexplanon vs. Liletta    #Isolated mild range BP on arrival  - PreE labs drawn with admission blood work, patient has no si/sx severe disease. Continue close monitoring      Bessie Navarrete MD MPH                 "

## 2024-09-03 NOTE — PROGRESS NOTES
Pt arrived to L&D for IOL due to post dates. Placed in room 220    1030 - Pt called out, Vitals assessed. Pt denies LOF, denies VB. Reports normal FM and some mild cramping over the past week.  Pt denies visual changes, headache, RUQ pain, swelling.    1045 - Pt placed on EFM and TOCO to monitor for fetal well being and uterine activity.  Sve per flows    1100 - MD updated, orders placed.     1204 - RN to bedside, cytotec per MAR    1605 - MD to bedside. SVE per flows. POC discussed, all questions answered.    1905- Report to Carmel CHAN. Care relinquished.

## 2024-09-03 NOTE — CARE PLAN
The patient is Stable - Low risk of patient condition declining or worsening    Shift Goals  Clinical Goals:   Patient Goals: healthy mom healthy baby  Family Goals: support    Progress made toward(s) clinical / shift goals:    Problem: Risk for Infection and Impaired Wound Healing  Goal: Patient will remain free from infection  Outcome: Progressing  Patient is afebrile. VS monitored per orders.     Problem: Pain  Goal: Patient's pain will be alleviated or reduced to the patient’s comfort goal  Outcome: Progressing   Pt educated on pain management options. Understands the use of both pharmacological and non-pharmacological pain interventions. Pain assessments continuously implemented.               06-Oct-2021 21:41 coordination of care

## 2024-09-04 LAB
ERYTHROCYTE [DISTWIDTH] IN BLOOD BY AUTOMATED COUNT: 43.4 FL (ref 35.9–50)
HCT VFR BLD AUTO: 34.2 % (ref 37–47)
HGB BLD-MCNC: 11.6 G/DL (ref 12–16)
MCH RBC QN AUTO: 29.1 PG (ref 27–33)
MCHC RBC AUTO-ENTMCNC: 33.9 G/DL (ref 32.2–35.5)
MCV RBC AUTO: 85.9 FL (ref 81.4–97.8)
PLATELET # BLD AUTO: 173 K/UL (ref 164–446)
PMV BLD AUTO: 11 FL (ref 9–12.9)
RBC # BLD AUTO: 3.98 M/UL (ref 4.2–5.4)
WBC # BLD AUTO: 14.8 K/UL (ref 4.8–10.8)

## 2024-09-04 PROCEDURE — 59400 OBSTETRICAL CARE: CPT | Performed by: NURSE PRACTITIONER

## 2024-09-04 PROCEDURE — 700111 HCHG RX REV CODE 636 W/ 250 OVERRIDE (IP): Performed by: STUDENT IN AN ORGANIZED HEALTH CARE EDUCATION/TRAINING PROGRAM

## 2024-09-04 PROCEDURE — 36415 COLL VENOUS BLD VENIPUNCTURE: CPT

## 2024-09-04 PROCEDURE — 770002 HCHG ROOM/CARE - OB PRIVATE (112)

## 2024-09-04 PROCEDURE — 0HQ9XZZ REPAIR PERINEUM SKIN, EXTERNAL APPROACH: ICD-10-PCS | Performed by: STUDENT IN AN ORGANIZED HEALTH CARE EDUCATION/TRAINING PROGRAM

## 2024-09-04 PROCEDURE — 700102 HCHG RX REV CODE 250 W/ 637 OVERRIDE(OP): Performed by: NURSE PRACTITIONER

## 2024-09-04 PROCEDURE — 59409 OBSTETRICAL CARE: CPT

## 2024-09-04 PROCEDURE — A9270 NON-COVERED ITEM OR SERVICE: HCPCS | Performed by: NURSE PRACTITIONER

## 2024-09-04 PROCEDURE — 700105 HCHG RX REV CODE 258: Performed by: NURSE PRACTITIONER

## 2024-09-04 PROCEDURE — 85027 COMPLETE CBC AUTOMATED: CPT

## 2024-09-04 PROCEDURE — 3E033VJ INTRODUCTION OF OTHER HORMONE INTO PERIPHERAL VEIN, PERCUTANEOUS APPROACH: ICD-10-PCS | Performed by: STUDENT IN AN ORGANIZED HEALTH CARE EDUCATION/TRAINING PROGRAM

## 2024-09-04 PROCEDURE — 10907ZC DRAINAGE OF AMNIOTIC FLUID, THERAPEUTIC FROM PRODUCTS OF CONCEPTION, VIA NATURAL OR ARTIFICIAL OPENING: ICD-10-PCS | Performed by: STUDENT IN AN ORGANIZED HEALTH CARE EDUCATION/TRAINING PROGRAM

## 2024-09-04 PROCEDURE — 700105 HCHG RX REV CODE 258: Performed by: STUDENT IN AN ORGANIZED HEALTH CARE EDUCATION/TRAINING PROGRAM

## 2024-09-04 PROCEDURE — 304965 HCHG RECOVERY SERVICES

## 2024-09-04 RX ORDER — BISACODYL 10 MG
10 SUPPOSITORY, RECTAL RECTAL PRN
Status: DISCONTINUED | OUTPATIENT
Start: 2024-09-04 | End: 2024-09-05 | Stop reason: HOSPADM

## 2024-09-04 RX ORDER — DOCUSATE SODIUM 100 MG/1
100 CAPSULE, LIQUID FILLED ORAL 2 TIMES DAILY PRN
Status: DISCONTINUED | OUTPATIENT
Start: 2024-09-04 | End: 2024-09-05 | Stop reason: HOSPADM

## 2024-09-04 RX ORDER — IBUPROFEN 800 MG/1
800 TABLET, FILM COATED ORAL EVERY 8 HOURS PRN
Status: DISCONTINUED | OUTPATIENT
Start: 2024-09-04 | End: 2024-09-05 | Stop reason: HOSPADM

## 2024-09-04 RX ORDER — SODIUM CHLORIDE, SODIUM LACTATE, POTASSIUM CHLORIDE, CALCIUM CHLORIDE 600; 310; 30; 20 MG/100ML; MG/100ML; MG/100ML; MG/100ML
INJECTION, SOLUTION INTRAVENOUS PRN
Status: DISCONTINUED | OUTPATIENT
Start: 2024-09-04 | End: 2024-09-05 | Stop reason: HOSPADM

## 2024-09-04 RX ORDER — SIMETHICONE 125 MG
125 TABLET,CHEWABLE ORAL 4 TIMES DAILY PRN
Status: DISCONTINUED | OUTPATIENT
Start: 2024-09-04 | End: 2024-09-05 | Stop reason: HOSPADM

## 2024-09-04 RX ORDER — DEXTROSE, SODIUM CHLORIDE, SODIUM LACTATE, POTASSIUM CHLORIDE, AND CALCIUM CHLORIDE 5; .6; .31; .03; .02 G/100ML; G/100ML; G/100ML; G/100ML; G/100ML
INJECTION, SOLUTION INTRAVENOUS ONCE
Status: COMPLETED | OUTPATIENT
Start: 2024-09-04 | End: 2024-09-04

## 2024-09-04 RX ORDER — VITAMIN A ACETATE, BETA CAROTENE, ASCORBIC ACID, CHOLECALCIFEROL, .ALPHA.-TOCOPHEROL ACETATE, DL-, THIAMINE MONONITRATE, RIBOFLAVIN, NIACINAMIDE, PYRIDOXINE HYDROCHLORIDE, FOLIC ACID, CYANOCOBALAMIN, CALCIUM CARBONATE, FERROUS FUMARATE, ZINC OXIDE, CUPRIC OXIDE 3080; 12; 120; 400; 1; 1.84; 3; 20; 22; 920; 25; 200; 27; 10; 2 [IU]/1; UG/1; MG/1; [IU]/1; MG/1; MG/1; MG/1; MG/1; MG/1; [IU]/1; MG/1; MG/1; MG/1; MG/1; MG/1
1 TABLET, FILM COATED ORAL
Status: DISCONTINUED | OUTPATIENT
Start: 2024-09-05 | End: 2024-09-05 | Stop reason: HOSPADM

## 2024-09-04 RX ORDER — ACETAMINOPHEN 500 MG
1000 TABLET ORAL EVERY 6 HOURS PRN
Status: DISCONTINUED | OUTPATIENT
Start: 2024-09-04 | End: 2024-09-05 | Stop reason: HOSPADM

## 2024-09-04 RX ORDER — MISOPROSTOL 200 UG/1
600 TABLET ORAL
Status: DISCONTINUED | OUTPATIENT
Start: 2024-09-04 | End: 2024-09-05 | Stop reason: HOSPADM

## 2024-09-04 RX ORDER — DEXTROSE, SODIUM CHLORIDE, SODIUM LACTATE, POTASSIUM CHLORIDE, AND CALCIUM CHLORIDE 5; .6; .31; .03; .02 G/100ML; G/100ML; G/100ML; G/100ML; G/100ML
INJECTION, SOLUTION INTRAVENOUS CONTINUOUS
Status: DISCONTINUED | OUTPATIENT
Start: 2024-09-04 | End: 2024-09-04

## 2024-09-04 RX ORDER — CALCIUM CARBONATE 500 MG/1
1000 TABLET, CHEWABLE ORAL EVERY 6 HOURS PRN
Status: DISCONTINUED | OUTPATIENT
Start: 2024-09-04 | End: 2024-09-05 | Stop reason: HOSPADM

## 2024-09-04 RX ADMIN — ACETAMINOPHEN 1000 MG: 500 TABLET ORAL at 17:09

## 2024-09-04 RX ADMIN — SODIUM CHLORIDE, SODIUM LACTATE, POTASSIUM CHLORIDE, CALCIUM CHLORIDE AND DEXTROSE MONOHYDRATE: 5; 600; 310; 30; 20 INJECTION, SOLUTION INTRAVENOUS at 03:45

## 2024-09-04 RX ADMIN — SODIUM CHLORIDE, POTASSIUM CHLORIDE, SODIUM LACTATE AND CALCIUM CHLORIDE: 600; 310; 30; 20 INJECTION, SOLUTION INTRAVENOUS at 00:00

## 2024-09-04 RX ADMIN — AMPICILLIN SODIUM 1 G: 1 INJECTION, POWDER, FOR SOLUTION INTRAMUSCULAR; INTRAVENOUS at 04:00

## 2024-09-04 RX ADMIN — OXYTOCIN 125 ML/HR: 10 INJECTION, SOLUTION INTRAMUSCULAR; INTRAVENOUS at 09:52

## 2024-09-04 RX ADMIN — OXYTOCIN 20 UNITS: 10 INJECTION, SOLUTION INTRAMUSCULAR; INTRAVENOUS at 08:18

## 2024-09-04 ASSESSMENT — EDINBURGH POSTNATAL DEPRESSION SCALE (EPDS)
I HAVE LOOKED FORWARD WITH ENJOYMENT TO THINGS: AS MUCH AS I EVER DID
I HAVE FELT SAD OR MISERABLE: NO, NOT AT ALL
I HAVE FELT SCARED OR PANICKY FOR NO GOOD REASON: NO, NOT AT ALL
THINGS HAVE BEEN GETTING ON TOP OF ME: NO, I HAVE BEEN COPING AS WELL AS EVER
I HAVE BEEN SO UNHAPPY THAT I HAVE BEEN CRYING: NO, NEVER
THE THOUGHT OF HARMING MYSELF HAS OCCURRED TO ME: NEVER
I HAVE BEEN SO UNHAPPY THAT I HAVE HAD DIFFICULTY SLEEPING: NOT VERY OFTEN
I HAVE BLAMED MYSELF UNNECESSARILY WHEN THINGS WENT WRONG: NO, NEVER
I HAVE BEEN ABLE TO LAUGH AND SEE THE FUNNY SIDE OF THINGS: AS MUCH AS I ALWAYS COULD
I HAVE BEEN ANXIOUS OR WORRIED FOR NO GOOD REASON: NO, NOT AT ALL

## 2024-09-04 ASSESSMENT — PAIN DESCRIPTION - PAIN TYPE
TYPE: ACUTE PAIN

## 2024-09-04 NOTE — PROGRESS NOTES
PATIENT ID:  NAME:  Mee Ko  MRN:               [id number]  YOB: 2005    Subjective:   Pt reports not feeling much pain at this time, is up bouncing on ball.     Objective:    Vitals:    24 1215 24 1450 24 1830 24 1911   BP:  117/70 117/66 118/70   Pulse: 75 65 86 89   Resp:  16 16 16   Temp:  36.1 °C (97 °F) 36.4 °C (97.6 °F) 36.6 °C (97.8 °F)   TempSrc:  Temporal Temporal Temporal   SpO2: 97%   96%   Weight:       Height:           Cervix:  4cm/80%/-1, intact membranes  [place]: Uterine Contractions Q 2-5  minutes.   FHRM: Baseline 125, moderate variability, Accels present, no decels       Assessment:   19 y.o. female  at 40w3d.  GBS positive  Vertex     Plan:   LR for IV hydration  Will start anbx GBS prophylaxis   Pain management options discussed with patient  Will plan for AROM when pt is more uncomfortable with contractions and in better pattern   Continuous fetal heart rate and maternal monitoring  Anticipate

## 2024-09-04 NOTE — LACTATION NOTE
Initial Lactation Consultation:    Met with Mee and baby Alden to provide lactation support. Mee reports breastfeeding to be going well; baby has latched and vigorously nursed at breast twice since delivery.     Alden presents with feeding cues; he is placed skin-to-skin with his mother. Hand expression demonstrated for easily expressible colostrum. Lactation consultant assisted with latch onto left breast, using cradle hold. Latch sustained. Mother of baby denies pain with latch. Infant visualized to have rhythmic, nutritive suck pattern at breast, though he tires quickly, falling asleep at breast after <10 minutes.      feeding and voiding/stooling patterns reviewed. Frequent skin-to-skin and cue-based feeding is encouraged; at least 8 feeds per 24 hours. Reviewed the milk making process, inclusive of supply and demand. Discussed signs of deep, asymmetric latch, and the importance of maintaining good latch to avoid pain/nipple damage and maximize milk transfer. Mee is to offer both breasts at each feeding, and baby should be allowed to self-limit time at breast.     Feeding plan:     Continue with cue-based breastfeeding, at least once every three hours.    Mee is provided with the opportunity to ask questions. These have been answered to her satisfaction. She is encouraged to call RN/lactation for additional breastfeeding assistance, as needed, throughout remainder of hospital stay.       Encouraged follow up with [facility] office for outpatient lactation support.     [facility] Breastfeeding Resource list provided.

## 2024-09-04 NOTE — L&D DELIVERY NOTE
Delivery Note    PATIENT ID:  NAME:  Mee Ko  MRN:               [id number]  YOB: 2005        On 2024  at 08:14, this 19 y.o.,  40w4d now  , GBS negative female delivered via  under epidural anesthesia a viable male infant weight pending with APGAR scores of 8 and 9 at one and five minutes. No nuchal cord present.  Baby to maternal abdomen.  Spontaneous cry.  Mouth and nares bulb suctioned by RN. Delayed cord clamping occurred with cord doubly clamped by myself and cut by FOB.  Spontaneous delivery of placenta grossly intact at 08:19.  Pitocin infusing in IVF.  FF and bleeding light.  Upon vaginal exam, there was left labial abrasion that was stitched to gain hemostasis and a right vaginal abrasion that was also stitched with 3-0 vicryl in the usual sterile fashion. Pt and infant are stable and bonding.  Estimated blood loss: 200.      ARIADNE Mead [address]., Attending Physician

## 2024-09-04 NOTE — PROGRESS NOTES
0700: Bedside report received from ROCIO Burt. POC discussed. VSS. Care assumed at this time. Patient is complete/+1.     0731: This RN began pushing with patient.     0814:  of viable male infant. APGARS 8/9.     0819: Placenta delivery.     1145: Bedside report given to ROCIO Urban. POC discussed. Care relinquished at this time.

## 2024-09-04 NOTE — ANESTHESIA PROCEDURE NOTES
Epidural Block    Date/Time: 9/3/2024 11:38 PM    Performed by: Jevon Zendejas M.D.  Authorized by: Jevon Zendejas M.D.    Patient Location:  OB  Start Time:  9/3/2024 11:38 PM  End Time:  9/3/2024 11:48 PM  Reason for Block: labor analgesia    patient identified, IV checked, site marked, risks and benefits discussed, surgical consent, monitors and equipment checked and pre-op evaluation    Patient Position:  Sitting  Prep: ChloraPrep, patient draped and sterile technique    Monitoring:  Blood pressure, continuous pulse oximetry and heart rate  Approach:  Midline  Location:  L3-L4  Injection Technique:  ASHLIE saline  Skin infiltration:  Lidocaine  Strength:  1%  Dose:  3ml  Needle Type:  Tuohy  Needle Gauge:  17 G  Needle Length:  3.5 in  Loss of resistance::  7  Catheter Size:  19 G  Catheter at Skin Depth:  12  Test Dose Result:  Negative

## 2024-09-04 NOTE — PROGRESS NOTES
1115: This RN to bedside, pt ambulatory up to bathroom with steady gait, able to void, ronda care provided. Linens changed. Pt denies any needs/concerns.

## 2024-09-04 NOTE — CARE PLAN
The patient is Stable - Low risk of patient condition declining or worsening    Shift Goals  Clinical Goals: Delivery of healthy baby  Patient Goals: Healthy mom and baby  Family Goals: Support    Progress made toward(s) clinical / shift goals:    Problem: Knowledge Deficit - L&D  Goal: Patient and family/caregivers will demonstrate understanding of plan of care, disease process/condition, diagnostic tests and medications  Outcome: Progressing     Problem: Pain  Goal: Patient's pain will be alleviated or reduced to the patient’s comfort goal  Outcome: Progressing       Patient is not progressing towards the following goals:

## 2024-09-04 NOTE — CARE PLAN
The patient is Stable - Low risk of patient condition declining or worsening    Shift Goals  Clinical Goals: VSS, fundus firm, lochia light  Patient Goals: bond, breastfeed  Family Goals: emotional support    Progress made toward(s) clinical / shift goals:    Problem: Psychosocial - Postpartum  Goal: Patient will verbalize and demonstrate effective bonding and parenting behavior  Outcome: Progressing  Note: Patient participating in all cares for infant and responding to cues. Engaging in skin to skin. Encouraged to voice all feelings and concerns.      Problem: Altered Physiologic Condition  Goal: Patient physiologically stable as evidenced by normal lochia, palpable uterine involution and vitals within normal limits  Outcome: Progressing  Note: Fundus firm, lochia light. VSS.        Patient is not progressing towards the following goals:

## 2024-09-04 NOTE — PROGRESS NOTES
1900-Report received from Paige CHAN. POC discussed.  1940-Yuval SHAHN at bedside for SVE 4/80-1 and POC discussed. Orders received.  2330- Dr. Zendejas at bedside for Epidural placement  2336-Time out called  2343-Test dose negative  0010-Yuval APRN at bedside for SVE 5/80/-1 and AROM clear  0140- Update Yuval APRN on pt status and Fetal strip. Orders received.  0313- Update Yuval on Fetal strip. Orders received.  0640-Updated Yuval SHAHN on SVE 10/100/+1   0700-Report given to Mikey CHAN. POC discussed.

## 2024-09-04 NOTE — ANESTHESIA PREPROCEDURE EVALUATION
Date: 09/03/24  Procedure: Labor Epidural         Relevant Problems   ANESTHESIA (within normal limits)      PULMONARY (within normal limits)      NEURO (within normal limits)      CARDIAC (within normal limits)      GI (within normal limits)       (within normal limits)      ENDO (within normal limits)      OB (within normal limits)      Other   (positive) Encounter for induction of labor       Physical Exam    Airway   Mallampati: II  TM distance: >3 FB  Neck ROM: full       Cardiovascular - normal exam  Rhythm: regular  Rate: normal  (-) murmur     Dental - normal exam           Pulmonary - normal exam  Breath sounds clear to auscultation     Abdominal    Neurological - normal exam                   Anesthesia Plan    ASA 2       Plan - epidural   Neuraxial block will be labor analgesia                  Pertinent diagnostic labs and testing reviewed    Informed Consent:    Anesthetic plan and risks discussed with patient.

## 2024-09-04 NOTE — PROGRESS NOTES
PATIENT ID:  NAME:  Mee Ko  MRN:               [id number]  YOB: 2005    Subjective:   Pt now resting with her epidural.     Objective:    Vitals:    24 1215 24 1450 24 1830 24 1911   BP:  117/70 117/66 118/70   Pulse: 75 65 86 89   Resp:  16 16 16   Temp:  36.1 °C (97 °F) 36.4 °C (97.6 °F) 36.6 °C (97.8 °F)   TempSrc:  Temporal Temporal Temporal   SpO2: 97%   96%   Weight:       Height:           Cervix:  5cm/80%/-1, AROM'd  clear  [place]: Uterine Contractions Q 2-5  minutes.   FHRM: Baseline 115, moderate variability, Accels present, no decels   Pitocin: 6      Assessment:   19 y.o. female  at 40w4d.       Plan:   LR for IV hydration  AROM'd and continue pitocin IOL  Continuous fetal heart rate and maternal monitoring  Anticipate

## 2024-09-04 NOTE — PROGRESS NOTES
1300 Assumed care from Labor and Delivery ROCIO Urban. Patient transferred to unit in wheelchair with infant in arms and FOB at bedside. Patient transferred from wheelchair to hospital bed via ambulation, tolerated well. Oriented patient to environment, call light/emergency remote, lights, and bed controls. Assessment completed. Fundus firm, lochia light. Plan of care reviewed, verbalized understanding.

## 2024-09-04 NOTE — CARE PLAN
The patient is Watcher - Medium risk of patient condition declining or worsening    Shift Goals  Clinical Goals: safe delivery  Patient Goals: healthy mom and baby  Family Goals: emotional support    Progress made toward(s) clinical / shift goals:    Problem: Knowledge Deficit - L&D  Goal: Patient and family/caregivers will demonstrate understanding of plan of care, disease process/condition, diagnostic tests and medications  Description: Target End Date:  1-3 days or as soon as patient condition allows    1.  Oriented to unit, equipment, visitation policy and means for communicating concern  2.  Complete/review Learning Assessment  3.  Assess patient's preparation, knowledge level and expectations  4.  Provide accurate information in basic terms, clarify misconceptions  5.  Explain disease process/condition, treatment plan, diagnostic tests and medications  6.  Encourage patient and support person to ask questions and verbalize their understanding  7.  Instruct patient/support person in basic interpretation of fetal monitor  8.  Obtain informed consent when appropriate  9.  Demonstrate breathing/relaxation techniques  Outcome: Progressing     Problem: Risk for Infection and Impaired Wound Healing  Goal: Patient will remain free from infection  Description: Target End Date:  1 to 3 days    1.  Utilize Standard Precautions at all times to reduce the risk of transmission of microorganisms from both recognized and unrecognized sources of infection  2.  Infection prevention handouts provided (general/device/diagnosis specific) and documented in Patient Education  3.  Limit vaginal exams as necessary  4.  Use aseptic technique during vaginal exams  5.  Administer antibiotic therapy per provider order  6.  Assess for removal of lines and drains  7.  Line/drain care per policy and/or provider orders  Outcome: Progressing  Note: Patient will remain free from signs and symptoms of infection.     Problem: Pain  Goal:  Patient's pain will be alleviated or reduced to the patient’s comfort goal  Description: Target End Date:  Prior to discharge or change in level of care    1.  Document pain using the appropriate pain scale per order or unit policy  2.  Administer pain medications per provider order and/or assist with epidural/spinal placement as needed  3.  Pain management medications as ordered  4.  Educate and implement non-pharmacologic comfort measures (i.e. relaxation, distraction, massage, cold/heat therapy, etc.)  5.  Assess for nonverbal signs of ineffective coping with pain and offer pain medication and/or epidural anesthesia  Outcome: Progressing  Note: Patient will verbalize understanding of pain management options.

## 2024-09-05 ENCOUNTER — PHARMACY VISIT (OUTPATIENT)
Dept: PHARMACY | Facility: MEDICAL CENTER | Age: 19
End: 2024-09-05
Payer: MEDICARE

## 2024-09-05 VITALS
OXYGEN SATURATION: 96 % | BODY MASS INDEX: 36.51 KG/M2 | TEMPERATURE: 97.9 F | HEART RATE: 83 BPM | SYSTOLIC BLOOD PRESSURE: 117 MMHG | DIASTOLIC BLOOD PRESSURE: 66 MMHG | HEIGHT: 64 IN | WEIGHT: 213.85 LBS | RESPIRATION RATE: 17 BRPM

## 2024-09-05 PROBLEM — Z34.90 ENCOUNTER FOR INDUCTION OF LABOR: Status: RESOLVED | Noted: 2024-09-03 | Resolved: 2024-09-05

## 2024-09-05 PROBLEM — Z34.80 SUPERVISION OF OTHER NORMAL PREGNANCY, ANTEPARTUM: Status: RESOLVED | Noted: 2024-03-26 | Resolved: 2024-09-05

## 2024-09-05 PROCEDURE — 700102 HCHG RX REV CODE 250 W/ 637 OVERRIDE(OP): Performed by: NURSE PRACTITIONER

## 2024-09-05 PROCEDURE — RXMED WILLOW AMBULATORY MEDICATION CHARGE

## 2024-09-05 PROCEDURE — A9270 NON-COVERED ITEM OR SERVICE: HCPCS | Performed by: NURSE PRACTITIONER

## 2024-09-05 RX ORDER — IBUPROFEN 800 MG/1
800 TABLET, FILM COATED ORAL EVERY 8 HOURS PRN
Qty: 60 TABLET | Refills: 0 | Status: SHIPPED | OUTPATIENT
Start: 2024-09-05

## 2024-09-05 RX ORDER — ACETAMINOPHEN 500 MG
1000 TABLET ORAL EVERY 6 HOURS PRN
Qty: 30 TABLET | Refills: 0 | Status: SHIPPED | OUTPATIENT
Start: 2024-09-05

## 2024-09-05 RX ORDER — PSEUDOEPHEDRINE HCL 30 MG
100 TABLET ORAL 2 TIMES DAILY PRN
Qty: 60 CAPSULE | Refills: 0 | Status: SHIPPED | OUTPATIENT
Start: 2024-09-05

## 2024-09-05 RX ADMIN — ACETAMINOPHEN 1000 MG: 500 TABLET ORAL at 07:49

## 2024-09-05 RX ADMIN — PRENATAL WITH FERROUS FUM AND FOLIC ACID 1 TABLET: 3080; 920; 120; 400; 22; 1.84; 3; 20; 10; 1; 12; 200; 27; 25; 2 TABLET ORAL at 07:48

## 2024-09-05 ASSESSMENT — PAIN DESCRIPTION - PAIN TYPE
TYPE: ACUTE PAIN
TYPE: ACUTE PAIN

## 2024-09-05 NOTE — DISCHARGE SUMMARY
Discharge Summary:      Mee Ko      Admit Date:   9/3/2024  Discharge Date:  2024     Admitting diagnosis:  Encounter for induction of labor [Z34.90]  Discharge Diagnosis: Status post vaginal, spontaneous.  Pregnancy Complications: none  Tubal Ligation:  no        History:  History reviewed. No pertinent past medical history.    OB History    Para Term  AB Living   3 1 1   2 1   SAB IAB Ectopic Molar Multiple Live Births   1 1     0 1      # Outcome Date GA Lbr Alverto/2nd Weight Sex Type Anes PTL Lv   3 Term 22 40w4d / 01:44 7 lb 4.1 oz M Vag-Spont EPI N JYOTI   2 SAB 2022 4w0d             Birth Comments: Passed on its own   1 IAB 2022     TAB         Obstetric Comments   Surgical  at age 17 at 8 weeks       Patient has no known allergies.    Patient Active Problem List   Diagnosis    Group B Streptococcus carrier, +RV culture, currently pregnant    Postpartum care following vaginal delivery        Hospital Course:   19 y.o. 40w4d, now para 1, was admitted with the above mentioned diagnosis, underwent  elective IOL , vaginal, spontaneous. Patient postpartum course was unremarkable, with progressive advancement in diet, ambulation and toleration of oral analgesia. Patient without complaints today and desires discharge.      Vitals:    24 1302 24 1400 24 1800 24 0600   BP: 130/77 120/78 121/73 117/66   Pulse: 89 80 88 83   Resp: 17 15 19 17   Temp: 36.6 °C (97.9 °F) 36.4 °C (97.5 °F) 36.4 °C (97.5 °F) 36.6 °C (97.9 °F)   TempSrc: Temporal Temporal Temporal Temporal   SpO2: 98% 99% 100% 96%   Weight:       Height:           Exam:  Breast Exam: negative  Abdomen: Abdomen soft, non-tender. BS normal. No masses,  No organomegaly  Fundus Non Tender: yes  Incision: none  Perineum: healing well  Extremity: extremities, peripheral pulses and reflexes normal     Labs:  Component      Latest Ref Rng 3/22/2024 2024 9/3/2024 2024   WBC      4.8 - 10.8  K/uL 13.8 (H)  13.1 (H)  12.3 (H)  14.8 (H)    RBC      4.20 - 5.40 M/uL 4.47  4.18 (L)  4.20  3.98 (L)    Hemoglobin      12.0 - 16.0 g/dL 13.6  12.4  12.5  11.6 (L)    Hematocrit      37.0 - 47.0 % 39.2  37.5  36.2 (L)  34.2 (L)    MCV      81.4 - 97.8 fL 87.7  89.7  86.2  85.9    MCH      27.0 - 33.0 pg 30.4  29.7  29.8  29.1    MCHC      32.2 - 35.5 g/dL 34.7  33.1  34.5  33.9    RDW      35.9 - 50.0 fL 44.9  42.6  43.2  43.4    Platelet Count      164 - 446 K/uL 216  224  198  173    MPV      9.0 - 12.9 fL 11.0  10.7  11.4  11.0       Legend:  (H) High  (L) Low       Activity/Discharge instructions:  Discharge to home  Pelvic rest x 6 weeks  Return to hospital with: signs of infection including fever, severe onset abdominal tenderness, foul-smelling discharge; swelling in legs; difficulty breathing; severe new-onset headaches or blurry vision; increased vaginal bleeding or large clots.     Assessment:  normal postpartum course  Discharge Assessment: Stable for discharge home  Planning Nexplanon for PPC at 5-6 week PP visit     [facility] in 5 weeks for vaginal     Discharge Meds:   Ibuprofen 800mg q8 hours PRN  Tylenol 500mg q6 hours PRN  Colace 100mh BID PRN      Mery Garner C.N.M.

## 2024-09-05 NOTE — CARE PLAN
The patient is Stable - Low risk of patient condition declining or worsening    Shift Goals  Clinical Goals: pain control  Patient Goals: bond, breastfeed  Family Goals: emotional support    Progress made toward(s) clinical / shift goals:  Pain well controlled     Patient is not progressing towards the following goals:

## 2024-09-05 NOTE — PROGRESS NOTES
1900- Received report from Rayshawn CHAN. Assumed care. 12 hour chart check, MAR and orders reviewed.      2000- Assessment complete. Fundus firm and palpable, lochia scant  rubra. Pain management and interventions discussed with pt. SO at bedside. POC discussed. All questions and concerns discussed. No further concerns.

## 2024-09-05 NOTE — LACTATION NOTE
Follow up lactation visit:    Met with Mee and baby Alden to provide lactation support prior to hospital discharge.  Mee reports that Alden was cluster feeding this morning, and she feels that he is latching well, with the exception of a single shallow latch to her right breast, which resulted in  abrasion of the right nipple face.     Alden was recently circumcised and is currently fussy and spitty. We discussed that infant feeding patterns are commonly altered during the  6-8 hours post-circumcision. She is to continue offering the breast every 2-3 hours, sooner if infant begins showing hunger cues.    Mee is encouraged to call for latch assessment with next feeding. Lactation consultant to evaluate latch and assist with positioning to optimize latching, if desired by patient.    Feeding Plan:    Continue with cue-based breastfeeding, at least once every three hours, for a total of 8+ feeds per 24 hours.    Follow up with [facility] and/or [facility] [place] for outpatient lactation support.

## 2024-09-05 NOTE — DISCHARGE INSTRUCTIONS

## 2024-09-05 NOTE — CARE PLAN
The patient is Stable - Low risk of patient condition declining or worsening    Shift Goals  Clinical Goals: Firm fundus, normal lochia  Patient Goals: bond, breastfeed  Family Goals: emotional support    Progress made toward(s) clinical / shift goals:    Problem: Knowledge Deficit - Postpartum  Goal: Patient will verbalize and demonstrate understanding of self and infant care  Outcome: Progressing     Problem: Psychosocial - Postpartum  Goal: Patient will verbalize and demonstrate effective bonding and parenting behavior  Outcome: Progressing     Problem: Altered Physiologic Condition  Goal: Patient physiologically stable as evidenced by normal lochia, palpable uterine involution and vitals within normal limits  Outcome: Progressing

## 2024-09-05 NOTE — PROGRESS NOTES
Discussed discharge education, and follow up information for infant and MOB. Infant and MOB's bands matched. Cord clamp off. Cuddles removed.  Infant and MOB discharged in stable condition. Infant placed in car seat by MOB. Checked per RN.

## 2024-09-05 NOTE — DISCHARGE PLANNING
Discharge Planning Assessment Post Partum    Reason for Referral: History of THC  Address: [address] [place] [id number]  Phone: [phone]  Type of Living Situation: stable housing   Mom Diagnosis: Pregnancy, vaginal delivery   Baby Diagnosis: [place]-40.4 weeks   Primary Language: English     Name of Baby: Alden Roy (: 24)  Father of the Baby: Dago Roy   Involved in baby’s care? Yes  Contact Information: [phone]    Prenatal Care: [facility] starting at 17 weeks   Mom's PCP: Dr. Ana Cristina Garvin   PCP for new baby: Pediatrician list provided     Support System: FOB  Coping/Bonding between mother & baby: Yes  Source of Feeding: breast feeding   Supplies for Infant: prepared for infant; denies any needs    Mom's Insurance: [employer]   Baby Covered on Insurance:Yes  Mother Employed/School: Not currently   Other children in the home/names & ages: first baby     Financial Hardship/Income: No   Mom's Mental status: alert and oriented   Services used prior to admit: Medicaid and WIC     CPS History: No  Psychiatric History: No, MOB scored 1 on the EPDS screen   Domestic Violence History: No  Drug/ETOH History: History of THC.  Infant's UDS is positive for fentanyl which MOB received during labor.     Resources Provided: pediatrician list, children and family resource list, post partum support and counseling resources, and diaper bank assistance resources   Referrals Made: diaper bank referrals provided      Clearance for Discharge: Infant is cleared to discharge home with parents once medically cleared

## 2024-09-06 ASSESSMENT — PAIN SCALES - GENERAL: PAIN_LEVEL: 0

## 2024-09-06 NOTE — ANESTHESIA POSTPROCEDURE EVALUATION
Patient: Mee Ko    Procedure Summary       Date: 09/03/24 Room / Location:     Anesthesia Start: 2334 Anesthesia Stop: 09/04/24 0814    Procedure: Labor Epidural Diagnosis:     Scheduled Providers:  Responsible Provider: Franky Granda M.D.    Anesthesia Type: epidural ASA Status: 2            Final Anesthesia Type: epidural  Last vitals  BP     132/71   Temp     36.9   Pulse    112   Resp     16   SpO2     98     Anesthesia Post Evaluation    Patient location during evaluation: bedside  Patient participation: complete - patient participated  Level of consciousness: awake and alert  Pain score: 0    Airway patency: patent  Anesthetic complications: no  Cardiovascular status: hemodynamically stable  Respiratory status: acceptable  Hydration status: euvolemic    PONV: none          No notable events documented.     Nurse Pain Score: 2 (NPRS)

## 2024-09-06 NOTE — ANESTHESIA TIME REPORT
Anesthesia Start and Stop Event Times       Date Time Event    9/3/2024 2334 Ready for Procedure     2334 Anesthesia Start    9/4/2024 0814 Anesthesia Stop          Responsible Staff  09/03/24 to 09/04/24      Name Role Begin End    Jevon Zendejas M.D. Anesth 2334 0657    Franky Granda M.D. Anesth 0670 0831          Overtime Reason:  no overtime (within assigned shift)    Comments:

## 2024-10-11 ENCOUNTER — POST PARTUM (OUTPATIENT)
Dept: OBGYN | Facility: CLINIC | Age: 19
End: 2024-10-11
Payer: COMMERCIAL

## 2024-10-11 VITALS — WEIGHT: 188 LBS | SYSTOLIC BLOOD PRESSURE: 114 MMHG | BODY MASS INDEX: 32.27 KG/M2 | DIASTOLIC BLOOD PRESSURE: 68 MMHG

## 2024-10-11 DIAGNOSIS — R45.86 MOOD CHANGES: ICD-10-CM

## 2024-10-11 PROCEDURE — 0503F POSTPARTUM CARE VISIT: CPT | Performed by: NURSE PRACTITIONER

## 2024-10-11 PROCEDURE — 3074F SYST BP LT 130 MM HG: CPT | Performed by: NURSE PRACTITIONER

## 2024-10-11 PROCEDURE — 3078F DIAST BP <80 MM HG: CPT | Performed by: NURSE PRACTITIONER

## 2024-10-11 ASSESSMENT — EDINBURGH POSTNATAL DEPRESSION SCALE (EPDS)
THE THOUGHT OF HARMING MYSELF HAS OCCURRED TO ME: NEVER
I HAVE LOOKED FORWARD WITH ENJOYMENT TO THINGS: DEFINITELY LESS THAN I USED TO
I HAVE BLAMED MYSELF UNNECESSARILY WHEN THINGS WENT WRONG: NOT VERY OFTEN
I HAVE BEEN ANXIOUS OR WORRIED FOR NO GOOD REASON: YES, SOMETIMES
I HAVE BEEN SO UNHAPPY THAT I HAVE HAD DIFFICULTY SLEEPING: YES, MOST OF THE TIME
I HAVE BEEN SO UNHAPPY THAT I HAVE BEEN CRYING: ONLY OCCASIONALLY
TOTAL SCORE: 16
I HAVE BEEN ABLE TO LAUGH AND SEE THE FUNNY SIDE OF THINGS: DEFINITELY NOT SO MUCH NOW
I HAVE FELT SAD OR MISERABLE: NOT VERY OFTEN
THINGS HAVE BEEN GETTING ON TOP OF ME: YES, SOMETIMES I HAVEN'T BEEN COPING AS WELL AS USUAL
I HAVE FELT SCARED OR PANICKY FOR NO GOOD REASON: YES, SOMETIMES

## 2024-10-11 ASSESSMENT — ENCOUNTER SYMPTOMS
DEPRESSION: 1
CARDIOVASCULAR NEGATIVE: 1
HALLUCINATIONS: 0
GASTROINTESTINAL NEGATIVE: 1
MUSCULOSKELETAL NEGATIVE: 1
EYES NEGATIVE: 1
RESPIRATORY NEGATIVE: 1
NEUROLOGICAL NEGATIVE: 1
CONSTITUTIONAL NEGATIVE: 1
NERVOUS/ANXIOUS: 1
INSOMNIA: 0
MEMORY LOSS: 0

## 2024-10-11 ASSESSMENT — LIFESTYLE VARIABLES: SUBSTANCE_ABUSE: 0

## 2024-10-11 ASSESSMENT — FIBROSIS 4 INDEX: FIB4 SCORE: 0.65

## 2024-10-18 ENCOUNTER — TELEPHONE (OUTPATIENT)
Dept: OBGYN | Facility: CLINIC | Age: 19
End: 2024-10-18
Payer: COMMERCIAL

## 2024-10-29 ENCOUNTER — APPOINTMENT (OUTPATIENT)
Dept: OBGYN | Facility: CLINIC | Age: 19
End: 2024-10-29
Payer: COMMERCIAL

## 2024-11-14 ENCOUNTER — APPOINTMENT (OUTPATIENT)
Dept: OBGYN | Facility: CLINIC | Age: 19
End: 2024-11-14
Payer: COMMERCIAL

## 2024-11-14 VITALS — DIASTOLIC BLOOD PRESSURE: 74 MMHG | BODY MASS INDEX: 33.64 KG/M2 | WEIGHT: 196 LBS | SYSTOLIC BLOOD PRESSURE: 118 MMHG

## 2024-11-14 DIAGNOSIS — F43.23 ADJUSTMENT DISORDER WITH MIXED ANXIETY AND DEPRESSED MOOD: ICD-10-CM

## 2024-11-14 PROCEDURE — 3078F DIAST BP <80 MM HG: CPT | Performed by: ADVANCED PRACTICE MIDWIFE

## 2024-11-14 PROCEDURE — 3074F SYST BP LT 130 MM HG: CPT | Performed by: ADVANCED PRACTICE MIDWIFE

## 2024-11-14 PROCEDURE — 99215 OFFICE O/P EST HI 40 MIN: CPT | Performed by: ADVANCED PRACTICE MIDWIFE

## 2024-11-14 ASSESSMENT — EDINBURGH POSTNATAL DEPRESSION SCALE (EPDS)
I HAVE BEEN SO UNHAPPY THAT I HAVE BEEN CRYING: ONLY OCCASIONALLY
THINGS HAVE BEEN GETTING ON TOP OF ME: YES, SOMETIMES I HAVEN'T BEEN COPING AS WELL AS USUAL
I HAVE BLAMED MYSELF UNNECESSARILY WHEN THINGS WENT WRONG: NOT VERY OFTEN
THE THOUGHT OF HARMING MYSELF HAS OCCURRED TO ME: NEVER
I HAVE BEEN ABLE TO LAUGH AND SEE THE FUNNY SIDE OF THINGS: NOT QUITE SO MUCH NOW
I HAVE FELT SCARED OR PANICKY FOR NO GOOD REASON: YES, SOMETIMES
I HAVE BEEN ANXIOUS OR WORRIED FOR NO GOOD REASON: YES, VERY OFTEN
TOTAL SCORE: 17
I HAVE LOOKED FORWARD WITH ENJOYMENT TO THINGS: HARDLY AT ALL
I HAVE FELT SAD OR MISERABLE: NOT VERY OFTEN
I HAVE BEEN SO UNHAPPY THAT I HAVE HAD DIFFICULTY SLEEPING: YES, MOST OF THE TIME

## 2024-11-14 ASSESSMENT — FIBROSIS 4 INDEX: FIB4 SCORE: 0.65
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
## 2024-11-14 NOTE — PROGRESS NOTES
Pt here for mental health evaluation.     Pt states no complaints   Good# [phone]  Pharmacy confirmed.

## 2024-11-26 ENCOUNTER — APPOINTMENT (OUTPATIENT)
Dept: OBGYN | Facility: CLINIC | Age: 19
End: 2024-11-26
Payer: COMMERCIAL

## 2024-12-31 ENCOUNTER — APPOINTMENT (OUTPATIENT)
Dept: OBGYN | Facility: CLINIC | Age: 19
End: 2024-12-31
Payer: COMMERCIAL

## 2025-01-03 ENCOUNTER — TELEPHONE (OUTPATIENT)
Dept: OBGYN | Facility: CLINIC | Age: 20
End: 2025-01-03
Payer: COMMERCIAL

## 2025-01-03 NOTE — TELEPHONE ENCOUNTER
Pt called to get a return to work note. I returned Pts call to get more information as to what the letter needs to say. Pt did not answer and I left a message to call me back

## 2025-01-03 NOTE — TELEPHONE ENCOUNTER
Pt returned my call and clarified she needs a standard return to work note. The letter was created and uploaded to her my chart

## 2025-01-03 NOTE — LETTER
January 3, 2025       Patient: Mee Ko   YOB: 2005   Date of Visit: 1/3/2025         To Whom It May Concern:    In my medical opinion, I recommend that Mee Ko return to full duty, no restrictions.    If you have any questions or concerns, please don't hesitate to call [phone]          Sincerely,          GEMINI Gillette  Electronically Signed

## 2025-02-04 ENCOUNTER — APPOINTMENT (OUTPATIENT)
Dept: OBGYN | Facility: CLINIC | Age: 20
End: 2025-02-04
Payer: COMMERCIAL

## 2025-04-15 ENCOUNTER — TELEPHONE (OUTPATIENT)
Dept: OBGYN | Facility: CLINIC | Age: 20
End: 2025-04-15
Payer: COMMERCIAL

## 2025-04-15 DIAGNOSIS — Z32.01 POSITIVE PREGNANCY TEST: ICD-10-CM

## 2025-04-15 NOTE — TELEPHONE ENCOUNTER
Caller Name: Mee Ko    Call Back Number: [phone] (home)       How would the patient prefer to be contacted with a response: Phone call do NOT leave a detailed message    Pt called stating that she had an positive pregnancy test 10 days ago and would like an HCG test. I let pt know that I will send a message to Patricio and her MA, we will get back to her shortly. Pt verbalized understanding.

## 2025-04-16 ENCOUNTER — HOSPITAL ENCOUNTER (OUTPATIENT)
Dept: LAB | Facility: MEDICAL CENTER | Age: 20
End: 2025-04-16
Attending: ADVANCED PRACTICE MIDWIFE
Payer: COMMERCIAL

## 2025-04-16 DIAGNOSIS — Z32.01 POSITIVE PREGNANCY TEST: ICD-10-CM

## 2025-04-16 PROCEDURE — 84702 CHORIONIC GONADOTROPIN TEST: CPT

## 2025-04-16 PROCEDURE — 36415 COLL VENOUS BLD VENIPUNCTURE: CPT

## 2025-04-17 ENCOUNTER — RESULTS FOLLOW-UP (OUTPATIENT)
Dept: OBGYN | Facility: CLINIC | Age: 20
End: 2025-04-17

## 2025-04-17 LAB — B-HCG SERPL-ACNC: 1.8 MIU/ML (ref 0–5)

## 2025-07-14 ENCOUNTER — APPOINTMENT (OUTPATIENT)
Dept: LAB | Facility: MEDICAL CENTER | Age: 20
End: 2025-07-14
Payer: COMMERCIAL